# Patient Record
Sex: FEMALE | Race: BLACK OR AFRICAN AMERICAN
[De-identification: names, ages, dates, MRNs, and addresses within clinical notes are randomized per-mention and may not be internally consistent; named-entity substitution may affect disease eponyms.]

---

## 2017-03-16 ENCOUNTER — HOSPITAL ENCOUNTER (INPATIENT)
Dept: HOSPITAL 61 - ER | Age: 82
LOS: 8 days | Discharge: NURSING, REHAB OR LONG-TERM CARE | DRG: 248 | End: 2017-03-24
Attending: FAMILY MEDICINE | Admitting: FAMILY MEDICINE
Payer: MEDICARE

## 2017-03-16 VITALS — BODY MASS INDEX: 26.35 KG/M2 | HEIGHT: 62 IN | WEIGHT: 143.19 LBS

## 2017-03-16 DIAGNOSIS — F20.9: ICD-10-CM

## 2017-03-16 DIAGNOSIS — J44.9: ICD-10-CM

## 2017-03-16 DIAGNOSIS — Z86.010: ICD-10-CM

## 2017-03-16 DIAGNOSIS — I70.0: ICD-10-CM

## 2017-03-16 DIAGNOSIS — I50.23: ICD-10-CM

## 2017-03-16 DIAGNOSIS — I11.0: ICD-10-CM

## 2017-03-16 DIAGNOSIS — M85.80: ICD-10-CM

## 2017-03-16 DIAGNOSIS — I21.4: Primary | ICD-10-CM

## 2017-03-16 DIAGNOSIS — I87.2: ICD-10-CM

## 2017-03-16 DIAGNOSIS — E87.6: ICD-10-CM

## 2017-03-16 DIAGNOSIS — E74.39: ICD-10-CM

## 2017-03-16 DIAGNOSIS — I25.10: ICD-10-CM

## 2017-03-16 DIAGNOSIS — J96.01: ICD-10-CM

## 2017-03-16 DIAGNOSIS — F03.90: ICD-10-CM

## 2017-03-16 DIAGNOSIS — I08.3: ICD-10-CM

## 2017-03-16 DIAGNOSIS — Z90.49: ICD-10-CM

## 2017-03-16 DIAGNOSIS — I42.9: ICD-10-CM

## 2017-03-16 LAB
ALBUMIN SERPL-MCNC: 3.5 G/DL (ref 3.4–5)
ALP SERPL-CCNC: 65 U/L (ref 46–116)
ALT SERPL-CCNC: 25 U/L (ref 14–59)
ANION GAP SERPL CALC-SCNC: 11 MMOL/L (ref 6–14)
AST SERPL-CCNC: 26 U/L (ref 15–37)
BASOPHILS # BLD AUTO: 0 X10^3/UL (ref 0–0.2)
BASOPHILS NFR BLD: 0 % (ref 0–3)
BILIRUB DIRECT SERPL-MCNC: 0.2 MG/DL (ref 0–0.2)
BILIRUB SERPL-MCNC: 0.5 MG/DL (ref 0.2–1)
BUN SERPL-MCNC: 18 MG/DL (ref 7–20)
CALCIUM SERPL-MCNC: 9.6 MG/DL (ref 8.5–10.1)
CHLORIDE SERPL-SCNC: 106 MMOL/L (ref 98–107)
CO2 SERPL-SCNC: 28 MMOL/L (ref 21–32)
CREAT SERPL-MCNC: 1 MG/DL (ref 0.6–1)
EOSINOPHIL NFR BLD: 1 % (ref 0–3)
ERYTHROCYTE [DISTWIDTH] IN BLOOD BY AUTOMATED COUNT: 13.2 % (ref 11.5–14.5)
GFR SERPLBLD BASED ON 1.73 SQ M-ARVRAT: 63.3 ML/MIN
GLUCOSE SERPL-MCNC: 109 MG/DL (ref 70–99)
HCT VFR BLD CALC: 35.9 % (ref 36–47)
HGB BLD-MCNC: 11.3 G/DL (ref 12–15.5)
LYMPHOCYTES # BLD: 0.5 X10^3/UL (ref 1–4.8)
LYMPHOCYTES NFR BLD AUTO: 7 % (ref 24–48)
MCH RBC QN AUTO: 29 PG (ref 25–35)
MCHC RBC AUTO-ENTMCNC: 32 G/DL (ref 31–37)
MCV RBC AUTO: 92 FL (ref 79–100)
MONOCYTES NFR BLD: 8 % (ref 0–9)
NEUTROPHILS NFR BLD AUTO: 84 % (ref 31–73)
PLATELET # BLD AUTO: 178 X10^3/UL (ref 140–400)
POTASSIUM SERPL-SCNC: 3.2 MMOL/L (ref 3.5–5.1)
PROT SERPL-MCNC: 7.2 G/DL (ref 6.4–8.2)
RBC # BLD AUTO: 3.92 X10^6/UL (ref 3.5–5.4)
SODIUM SERPL-SCNC: 145 MMOL/L (ref 136–145)
WBC # BLD AUTO: 7.5 X10^3/UL (ref 4–11)

## 2017-03-16 PROCEDURE — 93306 TTE W/DOPPLER COMPLETE: CPT

## 2017-03-16 PROCEDURE — 84484 ASSAY OF TROPONIN QUANT: CPT

## 2017-03-16 PROCEDURE — 84443 ASSAY THYROID STIM HORMONE: CPT

## 2017-03-16 PROCEDURE — 83690 ASSAY OF LIPASE: CPT

## 2017-03-16 PROCEDURE — 96375 TX/PRO/DX INJ NEW DRUG ADDON: CPT

## 2017-03-16 PROCEDURE — 80061 LIPID PANEL: CPT

## 2017-03-16 PROCEDURE — 90686 IIV4 VACC NO PRSV 0.5 ML IM: CPT

## 2017-03-16 PROCEDURE — 80076 HEPATIC FUNCTION PANEL: CPT

## 2017-03-16 PROCEDURE — 90732 PPSV23 VACC 2 YRS+ SUBQ/IM: CPT

## 2017-03-16 PROCEDURE — 92928 PRQ TCAT PLMT NTRAC ST 1 LES: CPT

## 2017-03-16 PROCEDURE — 94640 AIRWAY INHALATION TREATMENT: CPT

## 2017-03-16 PROCEDURE — 81001 URINALYSIS AUTO W/SCOPE: CPT

## 2017-03-16 PROCEDURE — C1887 CATHETER, GUIDING: HCPCS

## 2017-03-16 PROCEDURE — 93454 CORONARY ARTERY ANGIO S&I: CPT

## 2017-03-16 PROCEDURE — 85027 COMPLETE CBC AUTOMATED: CPT

## 2017-03-16 PROCEDURE — C1769 GUIDE WIRE: HCPCS

## 2017-03-16 PROCEDURE — 73521 X-RAY EXAM HIPS BI 2 VIEWS: CPT

## 2017-03-16 PROCEDURE — 71020: CPT

## 2017-03-16 PROCEDURE — 83880 ASSAY OF NATRIURETIC PEPTIDE: CPT

## 2017-03-16 PROCEDURE — G0238 OTH RESP PROC, INDIV: HCPCS

## 2017-03-16 PROCEDURE — 82947 ASSAY GLUCOSE BLOOD QUANT: CPT

## 2017-03-16 PROCEDURE — 82550 ASSAY OF CK (CPK): CPT

## 2017-03-16 PROCEDURE — 83605 ASSAY OF LACTIC ACID: CPT

## 2017-03-16 PROCEDURE — C1725 CATH, TRANSLUMIN NON-LASER: HCPCS

## 2017-03-16 PROCEDURE — 80048 BASIC METABOLIC PNL TOTAL CA: CPT

## 2017-03-16 PROCEDURE — 83735 ASSAY OF MAGNESIUM: CPT

## 2017-03-16 PROCEDURE — 96374 THER/PROPH/DIAG INJ IV PUSH: CPT

## 2017-03-16 PROCEDURE — 94760 N-INVAS EAR/PLS OXIMETRY 1: CPT

## 2017-03-16 PROCEDURE — 36415 COLL VENOUS BLD VENIPUNCTURE: CPT

## 2017-03-16 PROCEDURE — C1713 ANCHOR/SCREW BN/BN,TIS/BN: HCPCS

## 2017-03-16 PROCEDURE — C1892 INTRO/SHEATH,FIXED,PEEL-AWAY: HCPCS

## 2017-03-16 PROCEDURE — 83036 HEMOGLOBIN GLYCOSYLATED A1C: CPT

## 2017-03-16 PROCEDURE — 71010: CPT

## 2017-03-16 PROCEDURE — 93005 ELECTROCARDIOGRAM TRACING: CPT

## 2017-03-16 NOTE — PHYS DOC
Past Medical History


Past Medical History:  Dementia, Schizophrenia


Additional Past Medical Histor:  Dementia


Past Surgical History:  Other


Additional Past Surgical Histo:  Polyps removed


Alcohol Use:  None


Drug Use:  None





Adult General


Chief Complaint


Chief Complaint:  LOWER EXTREMITY EDEMA





HPI


HPI


88-year-old female presenting to the emergency department today with lower 

extremity edema for the past week. She also feels mildly lightheaded when she 

stands up. Her family members here with her today. She denies any pain 

currently she denies being short of breath or having abdominal pain. Onset 1 

week. Location legs. Duration intermittent. Alleviated by raising up her feet.





Review of systems is negative for chest pain shortness of breath abdominal pain 

nausea vomiting fevers or chills. She also denies cough. All other review of 

systems is negative unless otherwise noted in history of present illness.





Review of Systems


Review of Systems


SEE ABOVE.





Current Medications


Current Medications








 Current Medications








 Medications


  (Trade)  Dose


 Ordered  Sig/Lexie  Start Time


 Stop Time Status Last Admin


Dose Admin


 


 Aspirin


  (Children'S


 Aspirin)  324 mg  1X  ONCE  3/17/17 00:15


 3/17/17 00:16 UNV  


 


 


 Furosemide


  (Lasix)  40 mg  1X  ONCE  3/17/17 00:15


 3/17/17 00:16 UNV  


 


 


 Morphine Sulfate  2 mg  PRN Q2HR  PRN  3/17/17 00:15


 3/18/17 00:14 UNV  


 


 


 Ondansetron HCl


  (Zofran)  4 mg  PRN Q8HRS  PRN  3/17/17 00:15


 3/18/17 00:14 UNV  


 


 


 Potassium Chloride


  (Klor-Con)  40 meq  1X  ONCE  3/17/17 00:15


 3/17/17 00:16 UNV  


 














Allergies


Allergies





 Allergies








Coded Allergies Type Severity Reaction Last Updated Verified


 


  No Known Drug Allergies    2/16/15 No











Physical Exam


Physical Exam





Constitutional: Well developed, well nourished, no acute distress, non-toxic 

appearance. 


HENT: Normocephalic, atraumatic, bilateral external ears normal, oropharynx 

moist, no oral exudates, nose normal. []


Eyes: PERRLA, EOMI, conjunctiva normal, no discharge.  


Neck: Normal range of motion, no tenderness, supple, no stridor.  


Cardiovascular:Heart rate regular rhythm, no murmur 


Lungs & Thorax:  Bilateral breath sounds clear to auscultation []


Abdomen: Bowel sounds normal, soft, no tenderness, no masses, no pulsatile 

masses.  


Skin: Warm, dry, no erythema, no rash.  


Back: No tenderness, no CVA tenderness. [] 


Extremities: No tenderness, no cyanosis, no clubbing, ROM intact, 2+ edema


Neurologic: Alert and oriented X 3, normal motor function, normal sensory 

function, no focal deficits noted. 


Psychologic: Affect normal, judgement normal, mood normal. []





Current Patient Data


Vital Signs





 Vital Signs








  Date Time  Temp Pulse Resp B/P Pulse Ox O2 Delivery O2 Flow Rate FiO2


 


3/16/17 21:52 98.0 85 20 98/63 99 Room Air  





 98.0       








Lab Values





 Laboratory Tests








Test


  3/16/17


23:15


 


White Blood Count


  7.5x10^3/uL


(4.0-11.0)


 


Red Blood Count


  3.92x10^6/uL


(3.50-5.40)


 


Hemoglobin


  11.3g/dL


(12.0-15.5)  L


 


Hematocrit


  35.9%


(36.0-47.0)  L


 


Mean Corpuscular Volume 92fL ()  


 


Mean Corpuscular Hemoglobin 29pg (25-35)  


 


Mean Corpuscular Hemoglobin


Concent 32g/dL (31-37)


 


 


Red Cell Distribution Width


  13.2%


(11.5-14.5)


 


Platelet Count


  178x10^3/uL


(140-400)


 


Neutrophils (%) (Auto) 84% (31-73)  H


 


Lymphocytes (%) (Auto) 7% (24-48)  L


 


Monocytes (%) (Auto) 8% (0-9)  


 


Eosinophils (%) (Auto) 1% (0-3)  


 


Basophils (%) (Auto) 0% (0-3)  


 


Neutrophils # (Auto)


  6.3x10^3uL


(1.8-7.7)


 


Lymphocytes # (Auto)


  0.5x10^3/uL


(1.0-4.8)  L


 


Monocytes # (Auto)


  0.6x10^3/uL


(0.0-1.1)


 


Eosinophils # (Auto)


  0.1x10^3/uL


(0.0-0.7)


 


Basophils # (Auto)


  0.0x10^3/uL


(0.0-0.2)


 


Sodium Level


  145mmol/L


(136-145)


 


Potassium Level


  3.2mmol/L


(3.5-5.1)  L


 


Chloride Level


  106mmol/L


()


 


Carbon Dioxide Level


  28mmol/L


(21-32)


 


Anion Gap 11 (6-14)  


 


Blood Urea Nitrogen


  18mg/dL (7-20)


 


 


Creatinine


  1.0mg/dL


(0.6-1.0)


 


Estimated GFR


(Cockcroft-Gault) 63.3  


 


 


Glucose Level


  109mg/dL


(70-99)  H


 


Lactic Acid Level


  2.7mmol/L


(0.4-2.0)  H


 


Calcium Level


  9.6mg/dL


(8.5-10.1)


 


Total Bilirubin


  0.5mg/dL


(0.2-1.0)


 


Direct Bilirubin


  0.2mg/dL


(0.0-0.2)


 


Aspartate Amino Transferase


(AST) 26U/L (15-37)  


 


 


Alanine Aminotransferase (ALT) 25U/L (14-59)  


 


Alkaline Phosphatase


  65U/L ()


 


 


Creatine Kinase


  214U/L


()  H


 


Troponin I Quantitative


  0.666ng/mL


(0.000-0.055)


 


NT-Pro-B-Type Natriuretic


Peptide 14078ph/mL


(0-449)  H


 


Total Protein


  7.2g/dL


(6.4-8.2)


 


Albumin


  3.5g/dL


(3.4-5.0)


 


Lipase


  74U/L ()


 





 Laboratory Tests


3/16/17 23:15








 Laboratory Tests


3/16/17 23:15











EKG


EKG


EKG shows sinus rhythm with a few PACs. LVH present with minimal 

repolarization. Not suggestive of acute ischemia. []





Radiology/Procedures


Radiology/Procedures


[] Chest x-ray read by myself shows pulmonary edema present. Mild blunting of 

the costophrenic angles bilaterally suggestive of pleural effusions. Otherwise 

no evidence of pneumothorax or infiltrate present.





Course & Med Decision Making


Course & Med Decision Making


Pertinent Labs and Imaging studies reviewed. (See chart for details)





[] 88-year-old female presenting to the emergency department today with 

bilateral pedal edema. Triage vital signs showed afebrile normal heart rate. 

Satting well on room air. Pertinent physical exam findings showed edema in the 

legs bilaterally. Chest x-ray shows mild to moderate pulmonary edema. Blood 

work obtained. EKG not suggestive of ischemia. ProBNP elevated. Troponin mildly 

elevated. I added on serial troponins. Cardiology consultation. Patient given 

Lasix and aspirin in the emergency department. Patient admitted to our 

cardiovascular care unit for further evaluation workup and care.





Dragon Disclaimer


Dragon Disclaimer


This electronic medical record was generated, in whole or in part, using a 

voice recognition dictation system.





Departure


Departure


Impression:  


 Primary Impression:  


 Pedal edema


 Additional Impressions:  


 Pulmonary edema


 CHF (congestive heart failure)


Disposition:  09 ADMITTED AS INPATIENT


Admitting Physician:  Lucia Miller


Condition:  STABLE


Referrals:  


LUCIA MILLER MD (PCP)





Problem Qualifiers








DIDIER ADKINS MD Mar 16, 2017 23:20

## 2017-03-17 VITALS — DIASTOLIC BLOOD PRESSURE: 106 MMHG | SYSTOLIC BLOOD PRESSURE: 169 MMHG

## 2017-03-17 VITALS — SYSTOLIC BLOOD PRESSURE: 124 MMHG | DIASTOLIC BLOOD PRESSURE: 65 MMHG

## 2017-03-17 VITALS — SYSTOLIC BLOOD PRESSURE: 117 MMHG | DIASTOLIC BLOOD PRESSURE: 76 MMHG

## 2017-03-17 VITALS — SYSTOLIC BLOOD PRESSURE: 155 MMHG | DIASTOLIC BLOOD PRESSURE: 101 MMHG

## 2017-03-17 VITALS — DIASTOLIC BLOOD PRESSURE: 100 MMHG | SYSTOLIC BLOOD PRESSURE: 160 MMHG

## 2017-03-17 VITALS — DIASTOLIC BLOOD PRESSURE: 108 MMHG | SYSTOLIC BLOOD PRESSURE: 164 MMHG

## 2017-03-17 VITALS — SYSTOLIC BLOOD PRESSURE: 169 MMHG | DIASTOLIC BLOOD PRESSURE: 98 MMHG

## 2017-03-17 LAB
BACTERIA #/AREA URNS HPF: (no result) /HPF
BILIRUB UR QL STRIP: NEGATIVE
GLUCOSE UR STRIP-MCNC: NEGATIVE MG/DL
NITRITE UR QL STRIP: NEGATIVE
PH UR STRIP: 5 [PH]
PROT UR STRIP-MCNC: NEGATIVE MG/DL
RBC #/AREA URNS HPF: (no result) /HPF (ref 0–2)
SP GR UR STRIP: 1.01
SQUAMOUS #/AREA URNS LPF: (no result) /LPF
UROBILINOGEN UR-MCNC: 0.2 MG/DL
WBC #/AREA URNS HPF: (no result) /HPF (ref 0–4)

## 2017-03-17 RX ADMIN — ALBUTEROL SULFATE SCH MG: 108 AEROSOL, METERED RESPIRATORY (INHALATION) at 11:24

## 2017-03-17 RX ADMIN — ALBUTEROL SULFATE SCH MG: 108 AEROSOL, METERED RESPIRATORY (INHALATION) at 08:15

## 2017-03-17 RX ADMIN — ALBUTEROL SULFATE SCH MG: 108 AEROSOL, METERED RESPIRATORY (INHALATION) at 20:25

## 2017-03-17 NOTE — CARD
--------------- APPROVED REPORT --------------





EXAM: Two-dimensional and M-mode echocardiogram with Doppler and color Doppler.



Other Information 

Quality : Good



INDICATION

Congestive Heart Failure 



2D DIMENSIONS 

RVDd2.6 (2.9-3.5cm)Left Atrium(2D)4.1 (1.6-4.0cm)

IVSd1.3 (0.7-1.1cm)Aortic Root(2D)2.6 (2.0-3.7cm)

LVDd4.1 (3.9-5.9cm)LVOT Diameter2.0 (1.8-2.4cm)

PWd1.3 (0.7-1.1cm)LVDs3.0 (2.5-4.0cm)

FS (%) 20.0 %SV40.8 ml



Aortic Valve

AoV Peak Marvin.429.8cm/sAoV VTI89.2cm

AO Peak GR.73.9mmHgLVOT Peak Marvin.92.5cm/s

AO Mean GR.42mmHgAVA (VMAX)0.67cm2

JONNY   (VTI)0.28ck8RB P 1/2 Vkzw465lo



Mitral Valve

MV E Kimftpnn022.2cm/sMV E Peak Gr.16mmHg

MV DECEL [id number] A Kihfzzki93.3cm/s

MV E Mean Gr.7mmHgE/A  Ratio1.8

MV A Swlzoust83ap



Tricuspid Valve

TR P. Rkqzddjl019si/sRAP YTNACMTA26otDj

TR Peak Gr.09emWkYLTO14uhIl



 LEFT VENTRICLE 

The left ventricle is normal size. There is mild concentric left ventricular hypertrophy. Left ventri
cular systolic function is moderately to moderately severely decreased. Left ventricular ejection fra
ction is estimated at 35%. There is moderate global hypokinesis of the left ventricle. Transmitral Do
ppler flow pattern is restrictive diastolic dysfunction.



 RIGHT VENTRICLE 

The right ventricle is normal size. The right ventricular systolic function is normal.



 ATRIA 

The left atrium is mildly dilated. The right atrium is mildly dilated. The interatrial septum is inta
ct with no evidence for an atrial septal defect or patent foramen ovale as noted on 2-D or Doppler im
aging.



 AORTIC VALVE 

The aortic valve is calcified and displays decreased opening. Doppler and Color Flow revealed moderat
e aortic regurgitation. Calculated aortic valve maximum pressure gradient of 74 mmHg and mean pressur
e gradient of 42 mmHg. Doppler and color-flow analysis revealed moderately severe severe aortic steno
sis.



 MITRAL VALVE 

The mitral valve is thickened but opens well. There is no evidence of mitral valve prolapse. There is
 no mitral valve stenosis. Doppler and Color-flow revealed moderately severe mitral regurgitation.



 TRICUSPID VALVE 

The tricuspid valve is normal in structure and function. Doppler and Color Flow revealed moderately s
evere tricuspid regurgitation. The PA pressure was estimated at 78 mmHg. There is no tricuspid valve 
stenosis.



 PULMONIC VALVE 

The pulmonary valve is normal in structure and function. Doppler and Color Flow revealed mild pulmoni
c valvular regurgitation. There is no pulmonic valvular stenosis.



 GREAT VESSELS 

The aortic root is mildly enlarged at 3.5 cm. The IVC is dilated and collapses <50% with inspiration.




 PERICARDIAL EFFUSION 

There is moderate pleural effusion. There is a trace pericardial effusion with no hemodynamic signifi
cance. 



Critical Notification

Critical Value: No



<Conclusion>

The left ventricle is normal size.

Left ventricular systolic function is moderately to moderately severely decreased.

Left ventricular ejection fraction is estimated at 35%.

There is moderate global hypokinesis of the left ventricle.

There is mild concentric left ventricular hypertrophy.

Calculated aortic valve  maximum pressure gradient of 74 mmHg and mean pressure gradient of 42 mmHg.

Doppler and color-flow analysis revealed moderately severe severe aortic stenosis.

Doppler and Color Flow revealed moderate aortic regurgitation.

Doppler and Color-flow revealed moderately severe  mitral regurgitation.

Doppler and Color Flow revealed moderately severe tricuspid regurgitation.

The PA pressure was estimated at 78 mmHg.

## 2017-03-17 NOTE — PDOC2
CARDIAC CONSULT


DATE OF CONSULT


Date of Consult


DATE: 3/17/17 


TIME: 11:41





REASON FOR CONSULT


Reason for Consult:


CHF





REFERRING PHYSICIAN


Referring Physician:


Dr. Dusty Verdin





SOURCE


Source:  Chart review, Patient (who is a poor historian)





HISTORY OF PRESENT ILLNESS


HISTORY OF PRESENT ILLNESS


88 year old  female admitted through the ER with dyspnea and 

lower extremity edema of possibly 2 weeks duration.  No family present for this 

consultation. ? dizziness but no chest pain or dizziness. Initial troponin was 

0.666 and trending downward, EKG without acute changes and with T wave 

inversions in V4 and V5 not present on EKG from 2016.  NTproBNP was 11,602 and 

patient treated with IV Lasix in ER.  K - 3.2 and supplemented.  Lactic acid 

level 2.4.


Reason for Visit:  CHF





PAST MEDICAL HISTORY


Cardiovascular:  HTN (but has been off meds), Other (chronic venous 

insufficiency LE extremities)


CENTRAL NERVOUS SYSTEM:  Dementia


GI:  Other (colon polyps)


Psych:  Schizophrenia (who is not a reliable historian)


Endocrine:  Osteopenia





PAST SURGICAL HISTORY


Past Surgical History:  Appendectomy, Colectomy (partial)





FAMILY HISTORY


Family History:  Other (non-contributory)





SOCIAL HISTORY


Lives:  with Family (daughter)





CURRENT MEDICATIONS


CURRENT MEDICATIONS





Current Medications








 Medications


  (Trade)  Dose


 Ordered  Sig/Lexie


 Route


 PRN Reason  Start Time


 Stop Time Status Last Admin


Dose Admin


 


 Aspirin


  (Children'S


 Aspirin)  324 mg  1X  ONCE


 PO


   3/17/17 00:30


 3/17/17 00:31 DC 3/17/17 00:50


 


 


 Furosemide


  (Lasix)  40 mg  1X  ONCE


 IVP


   3/17/17 00:30


 3/17/17 00:31 DC 3/17/17 00:49


 


 


 Potassium Chloride


  (Klor-Con)  40 meq  1X  ONCE


 PO


   3/17/17 00:30


 3/17/17 00:31 DC 3/17/17 00:50


 


 


 Albuterol/


 Ipratropium


  (Duoneb)  3 ml  1X  ONCE


 NEB


   3/17/17 01:30


 3/17/17 01:31 DC 3/17/17 01:20


 


 


 Methylprednisolone


 Sodium Succinate


  (Solu-Medrol


 125mg Vial)  125 mg  1X  ONCE


 IV


   3/17/17 01:30


 3/17/17 01:31 DC 3/17/17 01:50


 











ALLERGIES


ALLERGIES:  


Coded Allergies:  


     No Known Drug Allergies (Unverified , 2/16/15)





PHYSICAL EXAM


General:  Alert, Cooperative, mild distress


HEENT:  Atraumatic, PERRLA


Lungs:  Other (basilar crackles; exp wheezing throughout posteriorly)


Heart:  Normal S1, Normal S2


Abdomen:  Normal bowel sounds, Soft


Extremities:  Other (3+ bilateral LE edema)


Skin:  No rashes, Other (stasis changes on bilateral LE )


Neuro:  Normal speech


Psych/Mental Status:  Mood NL


MUSCULOSKELETAL:  Osteoarthritic changes both hands





VITALS


VITALS





 Vital Signs








  Date Time  Temp Pulse Resp B/P Pulse Ox O2 Delivery O2 Flow Rate FiO2


 


3/17/17 10:22 97.3 105 23 164/108 96 Nasal Cannula 2.0 





 97.3       











LABS


Lab:





Laboratory Tests








Test


  3/16/17


23:15 3/17/17


06:43 3/17/17


09:27


 


White Blood Count


  7.5x10^3/uL


(4.0-11.0) 


  


 


 


Red Blood Count


  3.92x10^6/uL


(3.50-5.40) 


  


 


 


Hemoglobin


  11.3g/dL


(12.0-15.5) 


  


 


 


Hematocrit


  35.9%


(36.0-47.0) 


  


 


 


Mean Corpuscular Volume 92fL ()   


 


Mean Corpuscular Hemoglobin 29pg (25-35)   


 


Mean Corpuscular Hemoglobin


Concent 32g/dL (31-37) 


  


  


 


 


Red Cell Distribution Width


  13.2%


(11.5-14.5) 


  


 


 


Platelet Count


  178x10^3/uL


(140-400) 


  


 


 


Neutrophils (%) (Auto) 84% (31-73)   


 


Lymphocytes (%) (Auto) 7% (24-48)   


 


Monocytes (%) (Auto) 8% (0-9)   


 


Eosinophils (%) (Auto) 1% (0-3)   


 


Basophils (%) (Auto) 0% (0-3)   


 


Neutrophils # (Auto)


  6.3x10^3uL


(1.8-7.7) 


  


 


 


Lymphocytes # (Auto)


  0.5x10^3/uL


(1.0-4.8) 


  


 


 


Monocytes # (Auto)


  0.6x10^3/uL


(0.0-1.1) 


  


 


 


Eosinophils # (Auto)


  0.1x10^3/uL


(0.0-0.7) 


  


 


 


Basophils # (Auto)


  0.0x10^3/uL


(0.0-0.2) 


  


 


 


Sodium Level


  145mmol/L


(136-145) 


  


 


 


Potassium Level


  3.2mmol/L


(3.5-5.1) 


  


 


 


Chloride Level


  106mmol/L


() 


  


 


 


Carbon Dioxide Level


  28mmol/L


(21-32) 


  


 


 


Anion Gap 11 (6-14)   


 


Blood Urea Nitrogen 18mg/dL (7-20)   


 


Creatinine


  1.0mg/dL


(0.6-1.0) 


  


 


 


Estimated GFR


(Cockcroft-Gault) 63.3 


  


  


 


 


Glucose Level


  109mg/dL


(70-99) 


  


 


 


Lactic Acid Level


  2.7mmol/L


(0.4-2.0) 


  


 


 


Calcium Level


  9.6mg/dL


(8.5-10.1) 


  


 


 


Total Bilirubin


  0.5mg/dL


(0.2-1.0) 


  


 


 


Direct Bilirubin


  0.2mg/dL


(0.0-0.2) 


  


 


 


Aspartate Amino Transf


(AST/SGOT) 26U/L (15-37) 


  


  


 


 


Alanine Aminotransferase


(ALT/SGPT) 25U/L (14-59) 


  


  


 


 


Alkaline Phosphatase 65U/L ()   


 


Creatine Kinase


  214U/L


() 


  


 


 


Troponin I Quantitative


  0.666ng/mL


(0.000-0.055) 0.562ng/mL


(0.000-0.055) 


 


 


NT-Pro-B-Type Natriuretic


Peptide 99063ci/mL


(0-449) 


  


 


 


Total Protein


  7.2g/dL


(6.4-8.2) 


  


 


 


Albumin


  3.5g/dL


(3.4-5.0) 


  


 


 


Lipase 74U/L ()   


 


Thyroid Stimulating Hormone


(TSH) 


  1.520uIU/mL


(0.358-3.74) 


 


 


Glucose (Fingerstick)


  


  


  153mg/dL


(70-99)











IMAGES


IMAGES


CXR: 





Comparison is made to a study from 2/16/2015. The heart is enlarged. The


pulmonary vascularity is now congested with interstitial prominence suggesting


mild pulmonary edema. There is blunting of the lateral costophrenic angles


compatible with a small amount of associated pleural fluid. Degenerative


changes are present in the spine.








IMPRESSION: Congestive heart failure with mild pulmonary edema and small


pleural effusions.





EKG


EKG


see HPI





ASSESSMENT/PLAN


ASSESSMENT/PLAN


1. acute CHF,  ? diastolic


   BP initially low then elevated


   IV diuretics ~ MN in ER; will repeat with 20 mg IV X 1 now and re-evaluate 

in a.m. 


   echo to evaluate LV function 





2.  elevated troponin


   ? demand vs ischemia


   currently is poor candidate for aggressive evaluation, but would like to 

discuss with family to determine their preferences


      not currently able to lie flat


   echo to evaluate WMA





3.  HTN


   beta-blockers considered but with wheezing will defer on these


   started on amlodipine for  now (aware this may increase LE edema)





4.  hypokalemia 


   supplement as giving more furosemide


   check Mg





5.  CVI





6.  wheezing


   ? COPD





7.  dementia


Problems:  








JUDITH RODRIGUEZ APRN Mar 17, 2017 11:46

## 2017-03-17 NOTE — HP
ADMIT DATE:  03/17/2017



CHIEF COMPLAINT:  Lower extremity edema.



HISTORY OF PRESENT ILLNESS:  The patient is an 88-year-old female, who was

brought to the Emergency Room by her family with the above complaint.  She

apparently has a history of some chronic lower extremity edema, but the family

noticed that this had worsened recently.  Evaluation in the Emergency Room

included a chest x-ray which reportedly shows pulmonary edema.  EKG was without

acute ischemic change.  The patient was initially not hypoxic on room air, but

then became hypoxic.  She was placed on a nonrebreather mask.  She was treated

with one dose of Lasix in the Emergency Room and admitted for further treatment.



PAST MEDICAL HISTORY:  Glucose intolerance, chronic venous insufficiency,

schizophrenia, osteopenia, dementia.  The patient apparently has a history of

hypertension, but has not been on medications for her blood pressure for several

years.



PAST SURGICAL HISTORY:  Appendectomy and partial colectomy.



ALLERGIES:  The patient has no known drug allergies.



HOME MEDICATIONS:  None noted on the chart.



FAMILY HISTORY:  Noncontributory.



SOCIAL HISTORY:  The patient is .  She lives with her daughter who

provides her care at home.  She has not smoked cigarettes in 20 years.



REVIEW OF SYSTEMS:  This is a little difficult to obtain due to the patient's

memory loss.  According to the ER, she has denied fever or chills.  She denied

chest pain.  She denied shortness of breath or cough.  She denied abdominal

pain, nausea or vomiting.



PHYSICAL EXAMINATION:

GENERAL:  The patient is alert and oriented to person and place, but not time. 

She is resting comfortably in bed, in no acute distress.

HEENT:  PERRL, EOMI.  Sclerae clear.  Oropharynx:  Mucous membranes are moist.

NECK:  Supple, without lymphadenopathy.

CHEST:  Diffuse coarse breath sounds and expiratory wheezes are heard.

CARDIOVASCULAR:  Regular rhythm.

ABDOMEN:  Soft, nontender, normoactive bowel sounds are present.

EXTREMITIES:  2+ pitting edema with chronic venous stasis changes present in the

bilateral lower extremities.  No erythema.

SKIN:  Intact.



ASSESSMENT AND PLAN:

1.  Acute onset of congestive heart failure with acute respiratory failure: 

Chest x-ray report is pending, but the ER doctor's report indicates that it did

show pulmonary edema.  Nursing reports that her respiratory status has improved

significantly overnight.  She was initially requiring a nonrebreather mask, but

is now having a good oxygen saturation on 2 liters per nasal cannula.  She has

had a good urine output from the one dose of Lasix that she received at

midnight.  The patient's troponin is elevated at 0.666.  Cardiology consult is

pending.

2.  Chronic venous insufficiency:  The patient does have a history of some

chronic edema, which may be worsened now as part of the congestive heart

failure.  This should improve with diuresis.

3.  Glucose intolerance:  The patient has not been on medication for her blood

sugar in the past.  An A1c has been ordered.  Expect that she will have some

hyperglycemia as she received 125 mg of Solu-Medrol in the Emergency Room.

4.  Dementia:  The patient does have a history of this.  She took Risperdal in

the past, but has not been on this for several years.  We will continue

supportive care.

 



______________________________

SANDRA VYAS MD



DR:  ROGELIO/nathalia  JOB#:  [id number] / [id number]

DD:  03/17/2017 07:50  DT:  03/17/2017 08:56

GAMAL

## 2017-03-17 NOTE — EKG
[facility]

               [address] [place] [address]

Test Date:    2017               Test Time:    22:51:46

Pat Name:     KARENA LAM       Department:   

Patient ID:   [id number]           Room:         208 1

Gender:       F                        Technician:   

:          [id number]               Requested By: DIDIER ADKINS

Order Number: [id number]            Reading MD:   Charlette Noble

                                 Measurements

Intervals                              Axis          

Rate:         100                      P:            62

ME:           136                      QRS:          10

QRSD:         84                       T:            126

QT:           370                                    

QTc:          481                                    

                           Interpretive Statements

SINUS RHYTHM

ATRIAL PREMATURE COMPLEX(ES), TRIGEMINY

LEFT ATRIAL ABNORMALITY

LVH WITH REPOLARIZATION ABNORMALITY



ABNORMAL ECG



Electronically Signed On 3- 20:12:11 CDT by Charlette Noble

## 2017-03-17 NOTE — RAD
Pelvis with both hips, 5 views, 3/16/2016:



History: Fall, hip pain



No fracture or dislocation is identified. The hip joints spaces are fairly

well preserved. There are mild degenerative changes in the lower lumbar spine.



IMPRESSION: No acute pelvic or hip abnormality is detected.

## 2017-03-17 NOTE — RAD
Portable chest, 3/16/2017:



History: Leg swelling and weakness



Comparison is made to a study from 2/16/2015. The heart is enlarged. The

pulmonary vascularity is now congested with interstitial prominence suggesting

mild pulmonary edema. There is blunting of the lateral costophrenic angles

compatible with a small amount of associated pleural fluid. Degenerative

changes are present in the spine.





IMPRESSION: Congestive heart failure with mild pulmonary edema and small

pleural effusions.

## 2017-03-17 NOTE — [facility]
Admission Forms Criteria


                                                 HEART FAILURE





Clinical Indications for Admission to Inpatient Care


 


                                                          (Place 'X' for any 

and all applicable criteria):





Admission is indicated by ANY ONE of the following(1)(2)(3)(4):


[ ]I.     Severe electrolyte abnormalities requiring inpatient care(9)


[ ]II.    Hemodynamic instability 


[ ]III.   Anasarca 


[ ]IV.  Acute cardiac ischemia causing or associated with failure (Also use 

Angina or Myocardial Infarction as appropriate)


[ ]V.   Cardiac arrhythmias of immediate concern


[ ]VI.  Precipitating cause for acute decompensation (eg, pneumonia, pulmonary 

embolism) requires inpatient care


[ ]VII. Pulmonary edema that is very severe (eg, mechanical ventilation needed, 

imminent or likely, need for 100% oxygen to keep 


         oxygen saturation above 90%)


[ ]VIII. Inpatient admission required rather than observation care (Also use 

Heart Failure: Observation Care as 


         appropriate) because of ANY ONE of the following:


          [ ]a)   Pulmonary edema that is severe or worsening as indicated by 

ALL of the following:


                   [ ]i)   New need for oxygen therapy to keep oxygen 

saturation above 90% (or increased FiO2 need from baseline)


                   [ ]ii)  Has not improved sufficiently with emergency 

department or observation care IV diuretics or other heart failure treatments[C]


          [ ]b)   Cognitive impairment that is severe or persistent


          [ ]c)   Increased creatinine (new on laboratory test) with reduction 

of more than 50% in estimated glomerular filtration rate from baseline.


          [ ]d)   Acute renal insufficiency (progressively (ongoing) rising 

creatinine (known from past laboratory test) 


                   with reduction of more than 25% in estimated glomerular 

filtration rate from baseline)


          [ ]e)   Acute peripheral ischemia (eg, pulseless, cool, mottled, or 

cyanotic extremity)


          [ ]f)    Acute renal failure     


          [ ]g)   Supplemental O2 or respiratory treatment for >24 hr that are 

performable only in acute inpatient setting


          [ ]h)   Pulmonary artery catheter monitoring


          [ ]i)    Other condition, treatment or monitoring requiring inpatient 

admission





[X]IX.   Contraindications and/or Inappropriate clinical situations for 

Observational Care in patients


          with  Heart Failure, when ANY ONE of the following is required:


          [ ]a)    Patient with High risk of cardiac embolism (e.g, patients 

with previous cardiac embolism, LVEF <


                   40%, age >75 and patients with prosthetic valve) 18


          [ ]b)   Patient with Moderate risk including DM patient, CAD and 

patient aged 65-75 


          [ ]c)   Patient with any change in cardiac biomarker especially 

troponin should be managed as high risk in an inpatient setting 19


          [ ]d)   Physician judgement irrespective of ECG and other diagnostic 

findings 20


          [ ]e)   Patients with hyponatremia have high risk for mortality and 

require more extensive care and length of stay 21


          [X]f)    Need for large volume diuresis 21


          [ ]g)   Presence of renal insufficiency or hypotension limiting speed 

of diuresis 21


          [ ]h)   Acute cardiac Ischemia in the elderly 21


          [ ]i)    Patients with a 30 day risk of mortality based on a 

multidimensional prognostic index (MPI) [J,]21


[ ]X.    General contraindications and/or Inappropriate clinical situations for 

Observational Care in patients


          with Heart Failure, when ANY ONE of the following is required:


           [ ]a)   Prediction of prolongation of LOS based on ANY ONE of the 

following may be considered as 


                   a contraindication for observational care 2, 3, 4, 5, 6, 7, 8

, 9, 10, 11


                   [ ]i)    Age > 65 yrs.


                   [ ]ii)   Patient arriving by ambulance


                   [ ]iii)  Patient with high acuity


                   [ ]iv)  Patient requiring vital sign monitoring


                   [ ]v)   Patient on IV medication


           [ ]b)  Systolic blood pressures  180mmHg 3,12


           [ ]c)  Patient with altered mental status including delirium and 

other alteration of consciousness, (3)


           [ ]d)  Patient whose discharge disposition will be to a skilled 

nursing home or rehabilitation home should 


                   not be managed in Emergency Department Observation Unit. CMS 

rule requires 3 days hospital stay before such placement.3,13


           [ ]e)  Patient with failure to thrive due to broad array of 

etiologies 3,16,17


           [ ]f)  Inability to ambulate 3,14








Extended stay beyond goal length of stay may be needed for(1)(3)(21)(25):


[ ]a)    Cardiac ischemia, confirmed or suspected as precipitant 


[ ]b)    Cardiogenic shock or refractory pulmonary edema 


[ ]c)    Acute kidney injury or renal failure 


[ ]d)    Respiratory failure (eg, need for noninvasive or invasive mechanical 

ventilation) (23)


[ ]e)    Concomitant pneumonia or significant electrolyte abnormality (eg, 

severe hyponatremia)


[ ]f)     Newly diagnosed (new onset) atrial fibrillation        


[ ]g)    Stage IV chronic kidney disease (estimated glomerular filtration rate 

of less than 30 mL/min/1.73m2 


          (0.50 mL/sec/1.73m2), and not previously on chronic dialysis     








The original [facility] content created by [facility] 

[facility] has been revised. The portions of the


content which have been revised are identified through the use of italic text 

or in bold, and [facility] has neither      


reviewed nor approved the modified material. All other unmodified content is 

copyright  [facility].





Please see references footnoted in the original [facility] edition 

2016


Admission Criteria Met?:  Yes








LAURA MONZON Mar 17, 2017 01:34

## 2017-03-17 NOTE — PDOC
PROGRESS NOTES


Subjective


Subjective


Patient denies chest pain or SOA.





Objective


Objective





 Vital Signs








  Date Time  Temp Pulse Resp B/P Pulse Ox O2 Delivery O2 Flow Rate FiO2


 


3/17/17 06:41   22  100 Nasal Cannula 2.0 


 


3/17/17 04:45  79      


 


3/17/17 02:05 97.4   155/101    





 97.4       











Physical Exam


Abdomen:  Normal bowel sounds, Soft, No tenderness


Heart:  Regular rate


Extremities:  Other (2+ pitting edema with chronic stasis changes, no erythema, 

bilateral LE's)


General:  Alert (oriented to person and place), No acute distress


Lungs:  Other (diffuse coarse BS and expiratory wheezes)





Assessment


Assessment


 Problems


Medical Problems:


(1) CHF (congestive heart failure)


Status: Acute  





(2) Elevated glucose


Status: Acute  





(3) Pedal edema


Status: Acute  





(4) Pulmonary edema


Status: Acute  











Plan


Plan of Care


1. Acute onset CHF with acute respiratory failure - CXR report pending. Patient'

s first Troponin is elevated, EKG reportedly without acute ischemia and patient 

denies CP at this time. Received Lasix x1 in ER, has had good urine output with 

this and improvement in her respiratory status overnight. Lab pending from this 

morning. Nebs ordered, Cardiology consult pending.


2. chronic venous insufficiency - patient apparently has hx of chronic LE edema

, may be worsened now per family report. 


3. glucose intolerance - patient has not needed medication for this in the 

past. Check A1C. Expect some hyperglycemia as she received 125mg Solumedrol in 

ER.


4. dementia - has history of this, continue supportive care.





Comment


Review of Relevant


I have reviewed the following items wally (where applicable) has been applied.


Labs





Laboratory Tests








Test


  3/16/17


23:15 3/17/17


06:43


 


White Blood Count


  7.5x10^3/uL


(4.0-11.0) 


 


 


Red Blood Count


  3.92x10^6/uL


(3.50-5.40) 


 


 


Hemoglobin


  11.3g/dL


(12.0-15.5) 


 


 


Hematocrit


  35.9%


(36.0-47.0) 


 


 


Mean Corpuscular Volume 92fL ()  


 


Mean Corpuscular Hemoglobin 29pg (25-35)  


 


Mean Corpuscular Hemoglobin


Concent 32g/dL (31-37) 


  


 


 


Red Cell Distribution Width


  13.2%


(11.5-14.5) 


 


 


Platelet Count


  178x10^3/uL


(140-400) 


 


 


Neutrophils (%) (Auto) 84% (31-73)  


 


Lymphocytes (%) (Auto) 7% (24-48)  


 


Monocytes (%) (Auto) 8% (0-9)  


 


Eosinophils (%) (Auto) 1% (0-3)  


 


Basophils (%) (Auto) 0% (0-3)  


 


Neutrophils # (Auto)


  6.3x10^3uL


(1.8-7.7) 


 


 


Lymphocytes # (Auto)


  0.5x10^3/uL


(1.0-4.8) 


 


 


Monocytes # (Auto)


  0.6x10^3/uL


(0.0-1.1) 


 


 


Eosinophils # (Auto)


  0.1x10^3/uL


(0.0-0.7) 


 


 


Basophils # (Auto)


  0.0x10^3/uL


(0.0-0.2) 


 


 


Sodium Level


  145mmol/L


(136-145) 


 


 


Potassium Level


  3.2mmol/L


(3.5-5.1) 


 


 


Chloride Level


  106mmol/L


() 


 


 


Carbon Dioxide Level


  28mmol/L


(21-32) 


 


 


Anion Gap 11 (6-14)  


 


Blood Urea Nitrogen 18mg/dL (7-20)  


 


Creatinine


  1.0mg/dL


(0.6-1.0) 


 


 


Estimated GFR


(Cockcroft-Gault) 63.3 


  


 


 


Glucose Level


  109mg/dL


(70-99) 


 


 


Lactic Acid Level


  2.7mmol/L


(0.4-2.0) 


 


 


Calcium Level


  9.6mg/dL


(8.5-10.1) 


 


 


Total Bilirubin


  0.5mg/dL


(0.2-1.0) 


 


 


Direct Bilirubin


  0.2mg/dL


(0.0-0.2) 


 


 


Aspartate Amino Transf


(AST/SGOT) 26U/L (15-37) 


  


 


 


Alanine Aminotransferase


(ALT/SGPT) 25U/L (14-59) 


  


 


 


Alkaline Phosphatase 65U/L ()  


 


Creatine Kinase


  214U/L


() 


 


 


Troponin I Quantitative


  0.666ng/mL


(0.000-0.055) 0.562ng/mL


(0.000-0.055)


 


NT-Pro-B-Type Natriuretic


Peptide 96328ba/mL


(0-449) 


 


 


Total Protein


  7.2g/dL


(6.4-8.2) 


 


 


Albumin


  3.5g/dL


(3.4-5.0) 


 


 


Lipase 74U/L ()  








Laboratory Tests








Test


  3/16/17


23:15 3/17/17


06:43


 


White Blood Count


  7.5x10^3/uL


(4.0-11.0) 


 


 


Red Blood Count


  3.92x10^6/uL


(3.50-5.40) 


 


 


Hemoglobin


  11.3g/dL


(12.0-15.5) 


 


 


Hematocrit


  35.9%


(36.0-47.0) 


 


 


Mean Corpuscular Volume 92fL ()  


 


Mean Corpuscular Hemoglobin 29pg (25-35)  


 


Mean Corpuscular Hemoglobin


Concent 32g/dL (31-37) 


  


 


 


Red Cell Distribution Width


  13.2%


(11.5-14.5) 


 


 


Platelet Count


  178x10^3/uL


(140-400) 


 


 


Neutrophils (%) (Auto) 84% (31-73)  


 


Lymphocytes (%) (Auto) 7% (24-48)  


 


Monocytes (%) (Auto) 8% (0-9)  


 


Eosinophils (%) (Auto) 1% (0-3)  


 


Basophils (%) (Auto) 0% (0-3)  


 


Neutrophils # (Auto)


  6.3x10^3uL


(1.8-7.7) 


 


 


Lymphocytes # (Auto)


  0.5x10^3/uL


(1.0-4.8) 


 


 


Monocytes # (Auto)


  0.6x10^3/uL


(0.0-1.1) 


 


 


Eosinophils # (Auto)


  0.1x10^3/uL


(0.0-0.7) 


 


 


Basophils # (Auto)


  0.0x10^3/uL


(0.0-0.2) 


 


 


Sodium Level


  145mmol/L


(136-145) 


 


 


Potassium Level


  3.2mmol/L


(3.5-5.1) 


 


 


Chloride Level


  106mmol/L


() 


 


 


Carbon Dioxide Level


  28mmol/L


(21-32) 


 


 


Anion Gap 11 (6-14)  


 


Blood Urea Nitrogen 18mg/dL (7-20)  


 


Creatinine


  1.0mg/dL


(0.6-1.0) 


 


 


Estimated GFR


(Cockcroft-Gault) 63.3 


  


 


 


Glucose Level


  109mg/dL


(70-99) 


 


 


Lactic Acid Level


  2.7mmol/L


(0.4-2.0) 


 


 


Calcium Level


  9.6mg/dL


(8.5-10.1) 


 


 


Total Bilirubin


  0.5mg/dL


(0.2-1.0) 


 


 


Direct Bilirubin


  0.2mg/dL


(0.0-0.2) 


 


 


Aspartate Amino Transf


(AST/SGOT) 26U/L (15-37) 


  


 


 


Alanine Aminotransferase


(ALT/SGPT) 25U/L (14-59) 


  


 


 


Alkaline Phosphatase 65U/L ()  


 


Creatine Kinase


  214U/L


() 


 


 


Troponin I Quantitative


  0.666ng/mL


(0.000-0.055) 0.562ng/mL


(0.000-0.055)


 


NT-Pro-B-Type Natriuretic


Peptide 18448fv/mL


(0-449) 


 


 


Total Protein


  7.2g/dL


(6.4-8.2) 


 


 


Albumin


  3.5g/dL


(3.4-5.0) 


 


 


Lipase 74U/L ()  








Medications





 Current Medications


Aspirin (Children'S Aspirin) 324 mg 1X  ONCE PO  Last administered on 3/17/17at 

00:50;  Start 3/17/17 at 00:30;  Stop 3/17/17 at 00:31;  Status DC


Furosemide (Lasix) 40 mg 1X  ONCE IVP  Last administered on 3/17/17at 00:49;  

Start 3/17/17 at 00:30;  Stop 3/17/17 at 00:31;  Status DC


Ondansetron HCl (Zofran) 4 mg PRN Q8HRS  PRN IV NAUSEA/VOMITING;  Start 3/17/17 

at 00:15;  Stop 3/18/17 at 00:14


Morphine Sulfate 2 mg PRN Q2HR  PRN IV SEVERE PAIN;  Start 3/17/17 at 00:15;  

Stop 3/18/17 at 00:14


Potassium Chloride (Klor-Con) 40 meq 1X  ONCE PO  Last administered on 3/17/

17at 00:50;  Start 3/17/17 at 00:30;  Stop 3/17/17 at 00:31;  Status DC


Albuterol/ Ipratropium (Duoneb) 3 ml 1X  ONCE NEB  Last administered on 3/17/

17at 01:20;  Start 3/17/17 at 01:30;  Stop 3/17/17 at 01:31;  Status DC


Methylprednisolone Sodium Succinate (Solu-Medrol 125mg Vial) 125 mg 1X  ONCE IV

  Last administered on 3/17/17at 01:50;  Start 3/17/17 at 01:30;  Stop 3/17/17 

at 01:31;  Status DC


Info (Do NOT chart on this placeholder) 1 each PRN 1X  PRN MC SEE COMMENTS;  

Start 3/17/17 at 06:15;  Status UNV


Pneumococcal Polyvalent Vaccine (Do NOT chart on this placeholder) 1 each PRN 

1X  PRN MC SEE COMMENTS;  Start 3/17/17 at 06:15;  Status UNV


Influenza Virus Vaccine Quadrival (Fluarix Quad [id number] Syringe) 0.5 ml ONCE 

ONCE VAX IM ;  Start 3/17/17 at 09:00;  Stop 3/17/17 at 09:01


Pneumococcal Polyvalent Vaccine (Pneumovax 23) 0.5 ml ONCE ONCE VAX IM ;  Start 

3/17/17 at 09:00;  Stop 3/17/17 at 09:01





Active Scripts


Active


Reported


No Known Medications Prior To Admisstion (Info)  Each 1 Each 


Vitals/I & O





 Vital Sign - Last 24 Hours








 3/16/17 3/17/17 3/17/17 3/17/17





 21:52 01:18 01:31 02:05


 


Temp 98.0   97.4





 98.0   97.4


 


Pulse 85  76 60


 


Resp 20  22 18


 


B/P 98/63   155/101


 


Pulse Ox 99 95 99 94


 


O2 Delivery Room Air NonRebreather Mask NonRebreather Mask NonRebreather Mask


 


O2 Flow Rate  15.0 15 15.0


 


    





    





 3/17/17 3/17/17 3/17/17 3/17/17





 04:01 04:45 06:40 06:41


 


Pulse  79  


 


Resp  22 22 22


 


Pulse Ox  100 100 100


 


O2 Delivery Nasal Cannula Nasal Cannula Nasal Cannula Nasal Cannula


 


O2 Flow Rate 6.0 6.0 4.0 2.0














SANDRA VYAS MD Mar 17, 2017 07:41

## 2017-03-18 VITALS — SYSTOLIC BLOOD PRESSURE: 97 MMHG | DIASTOLIC BLOOD PRESSURE: 66 MMHG

## 2017-03-18 VITALS — DIASTOLIC BLOOD PRESSURE: 64 MMHG | SYSTOLIC BLOOD PRESSURE: 94 MMHG

## 2017-03-18 VITALS — SYSTOLIC BLOOD PRESSURE: 135 MMHG | DIASTOLIC BLOOD PRESSURE: 88 MMHG

## 2017-03-18 VITALS — DIASTOLIC BLOOD PRESSURE: 69 MMHG | SYSTOLIC BLOOD PRESSURE: 140 MMHG

## 2017-03-18 VITALS — DIASTOLIC BLOOD PRESSURE: 82 MMHG | SYSTOLIC BLOOD PRESSURE: 120 MMHG

## 2017-03-18 VITALS — SYSTOLIC BLOOD PRESSURE: 132 MMHG | DIASTOLIC BLOOD PRESSURE: 66 MMHG

## 2017-03-18 LAB
ANION GAP SERPL CALC-SCNC: 8 MMOL/L (ref 6–14)
BASOPHILS # BLD AUTO: 0 X10^3/UL (ref 0–0.2)
BASOPHILS NFR BLD: 0 % (ref 0–3)
BUN SERPL-MCNC: 21 MG/DL (ref 7–20)
CALCIUM SERPL-MCNC: 9.2 MG/DL (ref 8.5–10.1)
CHLORIDE SERPL-SCNC: 108 MMOL/L (ref 98–107)
CO2 SERPL-SCNC: 31 MMOL/L (ref 21–32)
CREAT SERPL-MCNC: 1 MG/DL (ref 0.6–1)
EOSINOPHIL NFR BLD: 0 % (ref 0–3)
ERYTHROCYTE [DISTWIDTH] IN BLOOD BY AUTOMATED COUNT: 13.7 % (ref 11.5–14.5)
GFR SERPLBLD BASED ON 1.73 SQ M-ARVRAT: 63.3 ML/MIN
GLUCOSE SERPL-MCNC: 110 MG/DL (ref 70–99)
HCT VFR BLD CALC: 34.4 % (ref 36–47)
HGB BLD-MCNC: 11 G/DL (ref 12–15.5)
LYMPHOCYTES # BLD: 0.8 X10^3/UL (ref 1–4.8)
LYMPHOCYTES NFR BLD AUTO: 10 % (ref 24–48)
MCH RBC QN AUTO: 29 PG (ref 25–35)
MCHC RBC AUTO-ENTMCNC: 32 G/DL (ref 31–37)
MCV RBC AUTO: 92 FL (ref 79–100)
MONOCYTES NFR BLD: 11 % (ref 0–9)
NEUTROPHILS NFR BLD AUTO: 79 % (ref 31–73)
PLATELET # BLD AUTO: 159 X10^3/UL (ref 140–400)
POTASSIUM SERPL-SCNC: 3.7 MMOL/L (ref 3.5–5.1)
RBC # BLD AUTO: 3.74 X10^6/UL (ref 3.5–5.4)
SODIUM SERPL-SCNC: 147 MMOL/L (ref 136–145)
WBC # BLD AUTO: 8 X10^3/UL (ref 4–11)

## 2017-03-18 RX ADMIN — LISINOPRIL SCH MG: 2.5 TABLET ORAL at 09:09

## 2017-03-18 RX ADMIN — ALBUTEROL SULFATE SCH MG: 108 AEROSOL, METERED RESPIRATORY (INHALATION) at 15:09

## 2017-03-18 RX ADMIN — INSULIN ASPART SCH UNITS: 100 INJECTION, SOLUTION INTRAVENOUS; SUBCUTANEOUS at 17:00

## 2017-03-18 RX ADMIN — FUROSEMIDE SCH MG: 10 INJECTION, SOLUTION INTRAMUSCULAR; INTRAVENOUS at 13:59

## 2017-03-18 RX ADMIN — FUROSEMIDE SCH MG: 10 INJECTION, SOLUTION INTRAMUSCULAR; INTRAVENOUS at 09:09

## 2017-03-18 RX ADMIN — CARVEDILOL SCH MG: 3.12 TABLET, FILM COATED ORAL at 09:09

## 2017-03-18 RX ADMIN — ALBUTEROL SULFATE SCH MG: 108 AEROSOL, METERED RESPIRATORY (INHALATION) at 20:41

## 2017-03-18 RX ADMIN — BUDESONIDE SCH MG: 0.5 SUSPENSION RESPIRATORY (INHALATION) at 20:41

## 2017-03-18 RX ADMIN — CARVEDILOL SCH MG: 3.12 TABLET, FILM COATED ORAL at 17:07

## 2017-03-18 NOTE — PDOC
PROGRESS NOTES


Subjective


Subjective


She is up in chair and she feels like she is better but still dyspneic when 

talking, wearing NC O2, feet up but still swollen, she is able to get to the 

bathroom but with assist





Objective


Objective





 Vital Signs








  Date Time  Temp Pulse Resp B/P Pulse Ox O2 Delivery O2 Flow Rate FiO2


 


3/18/17 10:53 98.0 96 24 135/88 99 Nasal Cannula 2.0 





 98.0       














 Intake and Output 


 


 3/18/17





 07:00


 


Intake Total 560 ml


 


Balance 560 ml


 


 


 


Intake Oral 560 ml


 


# Voids 2











Physical Exam


Abdomen:  Normal bowel sounds, Soft


Heart:  Regular rate


Extremities:  No clubbing, No cyanosis, Other (bilateral 3+ LE edema, skin 

starting to wrinkle, no ulcers, stasis dermatitis changes present without 

cellulitis)


General:  Alert, Cooperative, mild distress


HEENT:  Atraumatic


Lungs:  Other (expiratory wheezes, diminished throughout)


MUSCULOSKELETAL:  No joint tenderness


Neck:  Supple


Psych/Mental Status:  Mental status NL


Skin:  No significant lesion





Assessment


Assessment


 Problems


Medical Problems:


(1) CHF (congestive heart failure)


Status: Acute  





(2) Elevated glucose - A1c baljinder, will monitor and give SSI if needed


Status: Acute  





(3) Pedal edema - still present


Status: Acute  





(4) Pulmonary edema - being diuresed





(5) possible NSTEMI per enzymes - cardiology following





(6) valvular heart disease - had echo





(7) COPD - getting albuterol nebulized, add budesinide


Status: Acute  








Plan


Plan of Care


as above





Comment


Review of Relevant


I have reviewed the following items wally (where applicable) has been applied.


Labs





Laboratory Tests








Test


  3/16/17


23:15 3/17/17


06:43 3/17/17


09:27 3/17/17


12:05


 


White Blood Count


  7.5x10^3/uL


(4.0-11.0) 


  


  


 


 


Red Blood Count


  3.92x10^6/uL


(3.50-5.40) 


  


  


 


 


Hemoglobin


  11.3g/dL


(12.0-15.5) 


  


  


 


 


Hematocrit


  35.9%


(36.0-47.0) 


  


  


 


 


Mean Corpuscular Volume 92fL ()    


 


Mean Corpuscular Hemoglobin 29pg (25-35)    


 


Mean Corpuscular Hemoglobin


Concent 32g/dL (31-37) 


  


  


  


 


 


Red Cell Distribution Width


  13.2%


(11.5-14.5) 


  


  


 


 


Platelet Count


  178x10^3/uL


(140-400) 


  


  


 


 


Neutrophils (%) (Auto) 84% (31-73)    


 


Lymphocytes (%) (Auto) 7% (24-48)    


 


Monocytes (%) (Auto) 8% (0-9)    


 


Eosinophils (%) (Auto) 1% (0-3)    


 


Basophils (%) (Auto) 0% (0-3)    


 


Neutrophils # (Auto)


  6.3x10^3uL


(1.8-7.7) 


  


  


 


 


Lymphocytes # (Auto)


  0.5x10^3/uL


(1.0-4.8) 


  


  


 


 


Monocytes # (Auto)


  0.6x10^3/uL


(0.0-1.1) 


  


  


 


 


Eosinophils # (Auto)


  0.1x10^3/uL


(0.0-0.7) 


  


  


 


 


Basophils # (Auto)


  0.0x10^3/uL


(0.0-0.2) 


  


  


 


 


Sodium Level


  145mmol/L


(136-145) 


  


  


 


 


Potassium Level


  3.2mmol/L


(3.5-5.1) 


  


  


 


 


Chloride Level


  106mmol/L


() 


  


  


 


 


Carbon Dioxide Level


  28mmol/L


(21-32) 


  


  


 


 


Anion Gap 11 (6-14)    


 


Blood Urea Nitrogen 18mg/dL (7-20)    


 


Creatinine


  1.0mg/dL


(0.6-1.0) 


  


  


 


 


Estimated GFR


(Cockcroft-Gault) 63.3 


  


  


  


 


 


Glucose Level


  109mg/dL


(70-99) 


  


  


 


 


Lactic Acid Level


  2.7mmol/L


(0.4-2.0) 


  


  2.4mmol/L


(0.4-2.0)


 


Calcium Level


  9.6mg/dL


(8.5-10.1) 


  


  


 


 


Total Bilirubin


  0.5mg/dL


(0.2-1.0) 


  


  


 


 


Direct Bilirubin


  0.2mg/dL


(0.0-0.2) 


  


  


 


 


Aspartate Amino Transf


(AST/SGOT) 26U/L (15-37) 


  


  


  


 


 


Alanine Aminotransferase


(ALT/SGPT) 25U/L (14-59) 


  


  


  


 


 


Alkaline Phosphatase 65U/L ()    


 


Creatine Kinase


  214U/L


() 


  


  


 


 


Troponin I Quantitative


  0.666ng/mL


(0.000-0.055) 0.562ng/mL


(0.000-0.055) 


  0.515ng/mL


(0.000-0.055)


 


NT-Pro-B-Type Natriuretic


Peptide 96880xs/mL


(0-449) 


  


  


 


 


Total Protein


  7.2g/dL


(6.4-8.2) 


  


  


 


 


Albumin


  3.5g/dL


(3.4-5.0) 


  


  


 


 


Lipase 74U/L ()    


 


Hemoglobin A1c  5.6% (4.8-5.6)   


 


Thyroid Stimulating Hormone


(TSH) 


  1.520uIU/mL


(0.358-3.74) 


  


 


 


Glucose (Fingerstick)


  


  


  153mg/dL


(70-99) 


 


 


Magnesium Level


  


  


  


  1.9mg/dL


(1.8-2.4)














Test


  3/17/17


17:45 3/18/17


03:45 


  


 


 


Urine Collection Type Unknown    


 


Urine Color Yellow    


 


Urine Clarity Clear    


 


Urine pH 5.0    


 


Urine Specific Gravity 1.015    


 


Urine Protein


  Negativemg/dL


(NEG-TRACE) 


  


  


 


 


Urine Glucose (UA)


  Negativemg/dL


(NEG) 


  


  


 


 


Urine Ketones (Stick)


  Negativemg/dL


(NEG) 


  


  


 


 


Urine Blood Small (NEG)    


 


Urine Nitrite Negative (NEG)    


 


Urine Bilirubin Negative (NEG)    


 


Urine Urobilinogen Dipstick


  0.2mg/dL (0.2


mg/dL) 


  


  


 


 


Urine Leukocyte Esterase Negative (NEG)    


 


Urine RBC Rare/HPF (0-2)    


 


Urine WBC 1-4/HPF (0-4)    


 


Urine Squamous Epithelial


Cells Many/LPF 


  


  


  


 


 


Urine Bacteria


  Few/HPF


(0-FEW) 


  


  


 


 


Urine Hyaline Casts Occasional/HPF    


 


Urine Mucus Mod/LPF    


 


White Blood Count


  


  8.0x10^3/uL


(4.0-11.0) 


  


 


 


Red Blood Count


  


  3.74x10^6/uL


(3.50-5.40) 


  


 


 


Hemoglobin


  


  11.0g/dL


(12.0-15.5) 


  


 


 


Hematocrit


  


  34.4%


(36.0-47.0) 


  


 


 


Mean Corpuscular Volume  92fL ()   


 


Mean Corpuscular Hemoglobin  29pg (25-35)   


 


Mean Corpuscular Hemoglobin


Concent 


  32g/dL (31-37) 


  


  


 


 


Red Cell Distribution Width


  


  13.7%


(11.5-14.5) 


  


 


 


Platelet Count


  


  159x10^3/uL


(140-400) 


  


 


 


Neutrophils (%) (Auto)  79% (31-73)   


 


Lymphocytes (%) (Auto)  10% (24-48)   


 


Monocytes (%) (Auto)  11% (0-9)   


 


Eosinophils (%) (Auto)  0% (0-3)   


 


Basophils (%) (Auto)  0% (0-3)   


 


Neutrophils # (Auto)


  


  6.3x10^3uL


(1.8-7.7) 


  


 


 


Lymphocytes # (Auto)


  


  0.8x10^3/uL


(1.0-4.8) 


  


 


 


Monocytes # (Auto)


  


  0.9x10^3/uL


(0.0-1.1) 


  


 


 


Eosinophils # (Auto)


  


  0.0x10^3/uL


(0.0-0.7) 


  


 


 


Basophils # (Auto)


  


  0.0x10^3/uL


(0.0-0.2) 


  


 


 


Sodium Level


  


  147mmol/L


(136-145) 


  


 


 


Potassium Level


  


  3.7mmol/L


(3.5-5.1) 


  


 


 


Chloride Level


  


  108mmol/L


() 


  


 


 


Carbon Dioxide Level


  


  31mmol/L


(21-32) 


  


 


 


Anion Gap  8 (6-14)   


 


Blood Urea Nitrogen  21mg/dL (7-20)   


 


Creatinine


  


  1.0mg/dL


(0.6-1.0) 


  


 


 


Estimated GFR


(Cockcroft-Gault) 


  63.3 


  


  


 


 


Glucose Level


  


  110mg/dL


(70-99) 


  


 


 


Calcium Level


  


  9.2mg/dL


(8.5-10.1) 


  


 








Laboratory Tests








Test


  3/17/17


17:45 3/18/17


03:45


 


Urine Collection Type Unknown  


 


Urine Color Yellow  


 


Urine Clarity Clear  


 


Urine pH 5.0  


 


Urine Specific Gravity 1.015  


 


Urine Protein


  Negativemg/dL


(NEG-TRACE) 


 


 


Urine Glucose (UA)


  Negativemg/dL


(NEG) 


 


 


Urine Ketones (Stick)


  Negativemg/dL


(NEG) 


 


 


Urine Blood Small (NEG)  


 


Urine Nitrite Negative (NEG)  


 


Urine Bilirubin Negative (NEG)  


 


Urine Urobilinogen Dipstick


  0.2mg/dL (0.2


mg/dL) 


 


 


Urine Leukocyte Esterase Negative (NEG)  


 


Urine RBC Rare/HPF (0-2)  


 


Urine WBC 1-4/HPF (0-4)  


 


Urine Squamous Epithelial


Cells Many/LPF 


  


 


 


Urine Bacteria


  Few/HPF


(0-FEW) 


 


 


Urine Hyaline Casts Occasional/HPF  


 


Urine Mucus Mod/LPF  


 


White Blood Count


  


  8.0x10^3/uL


(4.0-11.0)


 


Red Blood Count


  


  3.74x10^6/uL


(3.50-5.40)


 


Hemoglobin


  


  11.0g/dL


(12.0-15.5)


 


Hematocrit


  


  34.4%


(36.0-47.0)


 


Mean Corpuscular Volume  92fL () 


 


Mean Corpuscular Hemoglobin  29pg (25-35) 


 


Mean Corpuscular Hemoglobin


Concent 


  32g/dL (31-37) 


 


 


Red Cell Distribution Width


  


  13.7%


(11.5-14.5)


 


Platelet Count


  


  159x10^3/uL


(140-400)


 


Neutrophils (%) (Auto)  79% (31-73) 


 


Lymphocytes (%) (Auto)  10% (24-48) 


 


Monocytes (%) (Auto)  11% (0-9) 


 


Eosinophils (%) (Auto)  0% (0-3) 


 


Basophils (%) (Auto)  0% (0-3) 


 


Neutrophils # (Auto)


  


  6.3x10^3uL


(1.8-7.7)


 


Lymphocytes # (Auto)


  


  0.8x10^3/uL


(1.0-4.8)


 


Monocytes # (Auto)


  


  0.9x10^3/uL


(0.0-1.1)


 


Eosinophils # (Auto)


  


  0.0x10^3/uL


(0.0-0.7)


 


Basophils # (Auto)


  


  0.0x10^3/uL


(0.0-0.2)


 


Sodium Level


  


  147mmol/L


(136-145)


 


Potassium Level


  


  3.7mmol/L


(3.5-5.1)


 


Chloride Level


  


  108mmol/L


()


 


Carbon Dioxide Level


  


  31mmol/L


(21-32)


 


Anion Gap  8 (6-14) 


 


Blood Urea Nitrogen  21mg/dL (7-20) 


 


Creatinine


  


  1.0mg/dL


(0.6-1.0)


 


Estimated GFR


(Cockcroft-Gault) 


  63.3 


 


 


Glucose Level


  


  110mg/dL


(70-99)


 


Calcium Level


  


  9.2mg/dL


(8.5-10.1)








Medications





 Current Medications


Aspirin (Children'S Aspirin) 324 mg 1X  ONCE PO  Last administered on 3/17/17at 

00:50;  Start 3/17/17 at 00:30;  Stop 3/17/17 at 00:31;  Status DC


Furosemide (Lasix) 40 mg 1X  ONCE IVP  Last administered on 3/17/17at 00:49;  

Start 3/17/17 at 00:30;  Stop 3/17/17 at 00:31;  Status DC


Ondansetron HCl (Zofran) 4 mg PRN Q8HRS  PRN IV NAUSEA/VOMITING;  Start 3/17/17 

at 00:15;  Stop 3/18/17 at 00:14;  Status DC


Morphine Sulfate 2 mg PRN Q2HR  PRN IV SEVERE PAIN;  Start 3/17/17 at 00:15;  

Stop 3/18/17 at 00:14;  Status DC


Potassium Chloride (Klor-Con) 40 meq 1X  ONCE PO  Last administered on 3/17/

17at 00:50;  Start 3/17/17 at 00:30;  Stop 3/17/17 at 00:31;  Status DC


Albuterol/ Ipratropium (Duoneb) 3 ml 1X  ONCE NEB  Last administered on 3/17/

17at 01:20;  Start 3/17/17 at 01:30;  Stop 3/17/17 at 01:31;  Status DC


Methylprednisolone Sodium Succinate (Solu-Medrol 125mg Vial) 125 mg 1X  ONCE IV

  Last administered on 3/17/17at 01:50;  Start 3/17/17 at 01:30;  Stop 3/17/17 

at 01:31;  Status DC


Info (Do NOT chart on this placeholder) 1 each PRN 1X  PRN MC SEE COMMENTS;  

Start 3/17/17 at 06:15;  Status UNV


Pneumococcal Polyvalent Vaccine (Do NOT chart on this placeholder) 1 each PRN 

1X  PRN MC SEE COMMENTS;  Start 3/17/17 at 06:15;  Status UNV


Influenza Virus Vaccine Quadrival (Fluarix Quad [id number] Syringe) 0.5 ml ONCE 

ONCE VAX IM ;  Start 3/17/17 at 09:00;  Stop 3/17/17 at 09:01;  Status DC


Pneumococcal Polyvalent Vaccine (Pneumovax 23) 0.5 ml ONCE ONCE VAX IM ;  Start 

3/17/17 at 09:00;  Stop 3/17/17 at 09:01;  Status DC


Albuterol Sulfate (Ventolin Neb Soln) 2.5 mg RTQID NEB  Last administered on 3/

17/17at 20:25;  Start 3/17/17 at 08:00


Furosemide (Lasix) 20 mg 1X  ONCE IVP  Last administered on 3/17/17at 12:34;  

Start 3/17/17 at 12:30;  Stop 3/17/17 at 12:31;  Status DC


Albuterol/ Ipratropium (Duoneb) 3 ml 1X  ONCE NEB  Last administered on 3/17/

17at 15:43;  Start 3/17/17 at 12:30;  Stop 3/17/17 at 12:31;  Status DC


Potassium Chloride (Klor-Con) 20 meq 1X  ONCE PO  Last administered on 3/17/

17at 12:35;  Start 3/17/17 at 12:00;  Stop 3/17/17 at 12:01;  Status DC


Amlodipine Besylate (Norvasc) 5 mg DAILY PO  Last administered on 3/17/17at 12:

34;  Start 3/17/17 at 12:00;  Stop 3/18/17 at 08:41;  Status DC


Furosemide (Lasix) 40 mg BID92 IVP  Last administered on 3/18/17at 09:09;  

Start 3/18/17 at 09:00


Carvedilol (Coreg) 3.125 mg BIDWMEALS PO  Last administered on 3/18/17at 09:09;

  Start 3/18/17 at 09:00


Lisinopril (Prinivil) 2.5 mg DAILY PO  Last administered on 3/18/17at 09:09;  

Start 3/18/17 at 09:00


Potassium Chloride (Klor-Con) 40 meq 1X  ONCE PO  Last administered on 3/18/

17at 09:26;  Start 3/18/17 at 08:45;  Stop 3/18/17 at 08:46;  Status DC





Active Scripts


Active


Reported


No Known Medications Prior To Admisstion (Info)  Each 1 Each 


Vitals/I & O





 Vital Sign - Last 24 Hours








 3/17/17 3/17/17 3/17/17 3/17/17





 12:34 15:00 15:46 19:40


 


Temp  97.9  97.6





  97.9  97.6


 


Pulse 94 103  87


 


Resp  24  22


 


B/P 164/108 160/100  117/76


 


Pulse Ox  95 95 92


 


O2 Delivery  Venturi Mask Nasal Cannula Nasal Cannula


 


O2 Flow Rate    2.0


 


    





    





 3/17/17 3/17/17 3/17/17 3/18/17





 20:00 20:29 22:50 02:50


 


Temp   98.2 98.1





   98.2 98.1


 


Pulse   96 87


 


Resp   20 22


 


B/P   124/65 132/66


 


Pulse Ox  94 96 99


 


O2 Delivery Nasal Cannula Nasal Cannula Nasal Cannula Nasal Cannula


 


O2 Flow Rate 2.0 2.0 2.0 2.0


 


    





    





 3/18/17 3/18/17 3/18/17 3/18/17





 07:50 08:15 09:09 09:09


 


Temp 98.2   





 98.2   


 


Pulse 87  87 87


 


Resp 22   


 


B/P 120/82  120/82 120/82


 


Pulse Ox 99   


 


O2 Delivery Nasal Cannula Nasal Cannula  


 


O2 Flow Rate 2.0 2.0  


 


    





    





 3/18/17   





 10:53   


 


Temp 98.0   





 98.0   


 


Pulse 96   


 


Resp 24   


 


B/P 135/88   


 


Pulse Ox 99   


 


O2 Delivery Nasal Cannula   


 


O2 Flow Rate 2.0   














 Intake and Output   


 


 3/17/17 3/17/17 3/18/17





 15:00 23:00 07:00


 


Intake Total  320 ml 240 ml


 


Balance  320 ml 240 ml














JUNITO HIRSCH MD Mar 18, 2017 12:17

## 2017-03-18 NOTE — PDOC
CHINEDU NOGUERA JOHNATHAN 3/18/17 0823:


CARDIO Progress Notes


Date and Time


Date of Service


3/18/17


Time of Evaluation


0820





Subjective


Subjective:  No Chest Pain, No Palpitations, No Dizziness, Other (c/o shortness 

of breath)





Vitals


Vitals





 Vital Signs








  Date Time  Temp Pulse Resp B/P Pulse Ox O2 Delivery O2 Flow Rate FiO2


 


3/18/17 02:50 98.1 87 22 132/66 99 Nasal Cannula 2.0 





 98.1       








Weight


Weight [ ]





Input and Output


Intake and Output











 Intake and Output 


 


 3/18/17





 07:00


 


Intake Total 560 ml


 


Balance 560 ml


 


 


 


Intake Oral 560 ml


 


# Voids 2











Laboratory


Labs





Laboratory Tests








Test


  3/17/17


09:27 3/17/17


12:05 3/17/17


17:45 3/18/17


03:45


 


Glucose (Fingerstick)


  153mg/dL


(70-99) 


  


  


 


 


Lactic Acid Level


  


  2.4mmol/L


(0.4-2.0) 


  


 


 


Magnesium Level


  


  1.9mg/dL


(1.8-2.4) 


  


 


 


Troponin I Quantitative


  


  0.515ng/mL


(0.000-0.055) 


  


 


 


Urine Collection Type   Unknown  


 


Urine Color   Yellow  


 


Urine Clarity   Clear  


 


Urine pH   5.0  


 


Urine Specific Gravity   1.015  


 


Urine Protein


  


  


  Negativemg/dL


(NEG-TRACE) 


 


 


Urine Glucose (UA)


  


  


  Negativemg/dL


(NEG) 


 


 


Urine Ketones (Stick)


  


  


  Negativemg/dL


(NEG) 


 


 


Urine Blood   Small (NEG)  


 


Urine Nitrite   Negative (NEG)  


 


Urine Bilirubin   Negative (NEG)  


 


Urine Urobilinogen Dipstick


  


  


  0.2mg/dL (0.2


mg/dL) 


 


 


Urine Leukocyte Esterase   Negative (NEG)  


 


Urine RBC   Rare/HPF (0-2)  


 


Urine WBC   1-4/HPF (0-4)  


 


Urine Squamous Epithelial


Cells 


  


  Many/LPF 


  


 


 


Urine Bacteria


  


  


  Few/HPF


(0-FEW) 


 


 


Urine Hyaline Casts   Occasional/HPF  


 


Urine Mucus   Mod/LPF  


 


White Blood Count


  


  


  


  8.0x10^3/uL


(4.0-11.0)


 


Red Blood Count


  


  


  


  3.74x10^6/uL


(3.50-5.40)


 


Hemoglobin


  


  


  


  11.0g/dL


(12.0-15.5)


 


Hematocrit


  


  


  


  34.4%


(36.0-47.0)


 


Mean Corpuscular Volume    92fL () 


 


Mean Corpuscular Hemoglobin    29pg (25-35) 


 


Mean Corpuscular Hemoglobin


Concent 


  


  


  32g/dL (31-37) 


 


 


Red Cell Distribution Width


  


  


  


  13.7%


(11.5-14.5)


 


Platelet Count


  


  


  


  159x10^3/uL


(140-400)


 


Neutrophils (%) (Auto)    79% (31-73) 


 


Lymphocytes (%) (Auto)    10% (24-48) 


 


Monocytes (%) (Auto)    11% (0-9) 


 


Eosinophils (%) (Auto)    0% (0-3) 


 


Basophils (%) (Auto)    0% (0-3) 


 


Neutrophils # (Auto)


  


  


  


  6.3x10^3uL


(1.8-7.7)


 


Lymphocytes # (Auto)


  


  


  


  0.8x10^3/uL


(1.0-4.8)


 


Monocytes # (Auto)


  


  


  


  0.9x10^3/uL


(0.0-1.1)


 


Eosinophils # (Auto)


  


  


  


  0.0x10^3/uL


(0.0-0.7)


 


Basophils # (Auto)


  


  


  


  0.0x10^3/uL


(0.0-0.2)


 


Sodium Level


  


  


  


  147mmol/L


(136-145)


 


Potassium Level


  


  


  


  3.7mmol/L


(3.5-5.1)


 


Chloride Level


  


  


  


  108mmol/L


()


 


Carbon Dioxide Level


  


  


  


  31mmol/L


(21-32)


 


Anion Gap    8 (6-14) 


 


Blood Urea Nitrogen    21mg/dL (7-20) 


 


Creatinine


  


  


  


  1.0mg/dL


(0.6-1.0)


 


Estimated GFR


(Cockcroft-Gault) 


  


  


  63.3 


 


 


Glucose Level


  


  


  


  110mg/dL


(70-99)


 


Calcium Level


  


  


  


  9.2mg/dL


(8.5-10.1)











Physical Exam


HEENT:  Neck Supple W Full Motion


Chest:  Symmetric


LUNGS:  Other (bibasilar crackles, no wheezing present)


Heart:  S1S2, other (tele SR with PAC's. )


Abdomen:  Soft N/T


Extremities:  Other (3+ bilateral LE edema with chronic venous stasis insuff)


Neurology:  alert, oriented, follow commands





Assessment


Assessment


1.  Acute systolic heart failure


2.  NSTEMI 


3.  Moderate-severe aortic stenosis 


4.  Acute respiratory failure


5.  HTN


6.  Chronic venous insufficiency


7.  Dementia














Recommendations





Echo revealed moderate global hypokinesis of LV with an EF of 35% and 

polyvalvular disease; no previous for comparison


? candidate for aggressive evaluation/intervention; will need to discuss with 

family to determine preferences/goals of care


d/c Norvasc. Start low-dose BB and ACE for optimization as BP allows


IV diuresis. Replace K. Consider milrinone gtt if no significant improvement in 

symptoms.





REGINA MAGANA MD 3/18/17 1143:


CARDIO Progress Notes


Plan


Plan


Pt. seen and examined. Agree with above NP note. 


no acute events overnight. 


appears to be mildly dyspnea. 3+ pitting edema. 


I had a long conversation with patient's DPOA about goals of care. The daughter 

(melanie) wishes to have aggressive therapy, this would include possible LHC + 

aortic valvuloplasty if needed. 


Will discuss with Dr. Dhillon. Plan for HF optimization over the weekend and 

LHC on monday if possible.








CHINEDU NOGUERA Mar 18, 2017 08:23


REGINA MAGANA MD Mar 18, 2017 11:43

## 2017-03-19 VITALS — DIASTOLIC BLOOD PRESSURE: 68 MMHG | SYSTOLIC BLOOD PRESSURE: 112 MMHG

## 2017-03-19 VITALS — DIASTOLIC BLOOD PRESSURE: 73 MMHG | SYSTOLIC BLOOD PRESSURE: 121 MMHG

## 2017-03-19 VITALS — DIASTOLIC BLOOD PRESSURE: 67 MMHG | SYSTOLIC BLOOD PRESSURE: 110 MMHG

## 2017-03-19 VITALS — DIASTOLIC BLOOD PRESSURE: 50 MMHG | SYSTOLIC BLOOD PRESSURE: 91 MMHG

## 2017-03-19 VITALS — DIASTOLIC BLOOD PRESSURE: 57 MMHG | SYSTOLIC BLOOD PRESSURE: 96 MMHG

## 2017-03-19 VITALS — DIASTOLIC BLOOD PRESSURE: 64 MMHG | SYSTOLIC BLOOD PRESSURE: 101 MMHG

## 2017-03-19 RX ADMIN — BUDESONIDE SCH MG: 0.5 SUSPENSION RESPIRATORY (INHALATION) at 19:17

## 2017-03-19 RX ADMIN — ALBUTEROL SULFATE SCH MG: 108 AEROSOL, METERED RESPIRATORY (INHALATION) at 11:31

## 2017-03-19 RX ADMIN — CEFPODOXIME PROXETIL SCH MG: 100 TABLET, FILM COATED ORAL at 11:49

## 2017-03-19 RX ADMIN — BUDESONIDE SCH MG: 0.5 SUSPENSION RESPIRATORY (INHALATION) at 07:44

## 2017-03-19 RX ADMIN — ALBUTEROL SULFATE SCH MG: 108 AEROSOL, METERED RESPIRATORY (INHALATION) at 19:17

## 2017-03-19 RX ADMIN — FUROSEMIDE SCH MG: 10 INJECTION, SOLUTION INTRAMUSCULAR; INTRAVENOUS at 14:02

## 2017-03-19 RX ADMIN — CEFPODOXIME PROXETIL SCH MG: 100 TABLET, FILM COATED ORAL at 20:37

## 2017-03-19 RX ADMIN — FUROSEMIDE SCH MG: 10 INJECTION, SOLUTION INTRAMUSCULAR; INTRAVENOUS at 08:01

## 2017-03-19 RX ADMIN — ALBUTEROL SULFATE SCH MG: 108 AEROSOL, METERED RESPIRATORY (INHALATION) at 07:44

## 2017-03-19 RX ADMIN — ALBUTEROL SULFATE SCH MG: 108 AEROSOL, METERED RESPIRATORY (INHALATION) at 15:55

## 2017-03-19 RX ADMIN — LISINOPRIL SCH MG: 2.5 TABLET ORAL at 08:01

## 2017-03-19 RX ADMIN — CARVEDILOL SCH MG: 3.12 TABLET, FILM COATED ORAL at 08:01

## 2017-03-19 RX ADMIN — INSULIN ASPART SCH UNITS: 100 INJECTION, SOLUTION INTRAVENOUS; SUBCUTANEOUS at 08:00

## 2017-03-19 RX ADMIN — CARVEDILOL SCH MG: 3.12 TABLET, FILM COATED ORAL at 17:24

## 2017-03-19 RX ADMIN — GUAIFENESIN SCH MG: 600 TABLET, EXTENDED RELEASE ORAL at 20:37

## 2017-03-19 RX ADMIN — GUAIFENESIN SCH MG: 600 TABLET, EXTENDED RELEASE ORAL at 11:49

## 2017-03-19 NOTE — PDOC
PROGRESS NOTES


Subjective


Subjective


Weak productive cough, still on O2, edema no better, albumin has been normal, 

still on IV lasix, blood sugars normal, A1C normal, EF 35% with LVH, global 

hypokinesis and calcific AS





Objective


Objective





 Vital Signs








  Date Time  Temp Pulse Resp B/P Pulse Ox O2 Delivery O2 Flow Rate FiO2


 


3/19/17 11:09 98.0 75  91/50 97 Nasal Cannula 2.0 





 98.0       


 


3/19/17 07:37   24     














 Intake and Output 


 


 3/19/17





 07:00


 


Intake Total 60 ml


 


Output Total 100 ml


 


Balance -40 ml


 


 


 


Intake Oral 60 ml


 


Output Urine Total 100 ml


 


# Voids 9











Physical Exam


Abdomen:  Soft


Heart:  Other (sinus per monitor with frequent PACs)


Extremities:  No clubbing, No cyanosis, Other (edema unchanged, 2-3+)


HEENT:  Atraumatic, EOMI, Mucous membr. moist/pink


Lungs:  Other (poor air movement, weak cough, productive sounding)


MUSCULOSKELETAL:  No joint tenderness


Neck:  Supple, No JVD


Neuro:  Other (weak voice, weak cough)


Psych/Mental Status:  Mental status NL


Skin:  No breakdown





Assessment


Assessment


 Problems


Medical Problems:


(1) CHF (congestive heart failure) with calcific aortic stenosis, global 

hypokenesis and EF of 35%, continue carvedilol, IV lasix, supplemental oxygen


Status: Acute  





(2) Elevated glucose - normal A1C, fingersticks normal so this was isolated, no 

diabetes but continue diabetic diet


Status: Acute  





(3) Pedal edema - slow to improve, albumin has been normal though


Status: Acute  





(4) Pulmonary edema- may also have bronchitis, add cefpodoxime, check sputum, f/

u CXR





(5) weakness, debility - PT eval, she may need SNU


Status: Acute  








Plan


Plan of Care


as above





Comment


Review of Relevant


I have reviewed the following items wally (where applicable) has been applied.


Labs





Laboratory Tests








Test


  3/17/17


12:05 3/17/17


17:45 3/18/17


03:45 3/18/17


16:46


 


Lactic Acid Level


  2.4mmol/L


(0.4-2.0) 


  


  


 


 


Magnesium Level


  1.9mg/dL


(1.8-2.4) 


  


  


 


 


Troponin I Quantitative


  0.515ng/mL


(0.000-0.055) 


  


  


 


 


Urine Collection Type  Unknown   


 


Urine Color  Yellow   


 


Urine Clarity  Clear   


 


Urine pH  5.0   


 


Urine Specific Gravity  1.015   


 


Urine Protein


  


  Negativemg/dL


(NEG-TRACE) 


  


 


 


Urine Glucose (UA)


  


  Negativemg/dL


(NEG) 


  


 


 


Urine Ketones (Stick)


  


  Negativemg/dL


(NEG) 


  


 


 


Urine Blood  Small (NEG)   


 


Urine Nitrite  Negative (NEG)   


 


Urine Bilirubin  Negative (NEG)   


 


Urine Urobilinogen Dipstick


  


  0.2mg/dL (0.2


mg/dL) 


  


 


 


Urine Leukocyte Esterase  Negative (NEG)   


 


Urine RBC  Rare/HPF (0-2)   


 


Urine WBC  1-4/HPF (0-4)   


 


Urine Squamous Epithelial


Cells 


  Many/LPF 


  


  


 


 


Urine Bacteria


  


  Few/HPF


(0-FEW) 


  


 


 


Urine Hyaline Casts  Occasional/HPF   


 


Urine Mucus  Mod/LPF   


 


White Blood Count


  


  


  8.0x10^3/uL


(4.0-11.0) 


 


 


Red Blood Count


  


  


  3.74x10^6/uL


(3.50-5.40) 


 


 


Hemoglobin


  


  


  11.0g/dL


(12.0-15.5) 


 


 


Hematocrit


  


  


  34.4%


(36.0-47.0) 


 


 


Mean Corpuscular Volume   92fL ()  


 


Mean Corpuscular Hemoglobin   29pg (25-35)  


 


Mean Corpuscular Hemoglobin


Concent 


  


  32g/dL (31-37) 


  


 


 


Red Cell Distribution Width


  


  


  13.7%


(11.5-14.5) 


 


 


Platelet Count


  


  


  159x10^3/uL


(140-400) 


 


 


Neutrophils (%) (Auto)   79% (31-73)  


 


Lymphocytes (%) (Auto)   10% (24-48)  


 


Monocytes (%) (Auto)   11% (0-9)  


 


Eosinophils (%) (Auto)   0% (0-3)  


 


Basophils (%) (Auto)   0% (0-3)  


 


Neutrophils # (Auto)


  


  


  6.3x10^3uL


(1.8-7.7) 


 


 


Lymphocytes # (Auto)


  


  


  0.8x10^3/uL


(1.0-4.8) 


 


 


Monocytes # (Auto)


  


  


  0.9x10^3/uL


(0.0-1.1) 


 


 


Eosinophils # (Auto)


  


  


  0.0x10^3/uL


(0.0-0.7) 


 


 


Basophils # (Auto)


  


  


  0.0x10^3/uL


(0.0-0.2) 


 


 


Sodium Level


  


  


  147mmol/L


(136-145) 


 


 


Potassium Level


  


  


  3.7mmol/L


(3.5-5.1) 


 


 


Chloride Level


  


  


  108mmol/L


() 


 


 


Carbon Dioxide Level


  


  


  31mmol/L


(21-32) 


 


 


Anion Gap   8 (6-14)  


 


Blood Urea Nitrogen   21mg/dL (7-20)  


 


Creatinine


  


  


  1.0mg/dL


(0.6-1.0) 


 


 


Estimated GFR


(Cockcroft-Gault) 


  


  63.3 


  


 


 


Glucose Level


  


  


  110mg/dL


(70-99) 


 


 


Calcium Level


  


  


  9.2mg/dL


(8.5-10.1) 


 


 


Glucose (Fingerstick)


  


  


  


  71mg/dL


(70-99)














Test


  3/18/17


20:59 3/19/17


08:03 


  


 


 


Glucose (Fingerstick)


  79mg/dL


(70-99) 106mg/dL


(70-99) 


  


 








Laboratory Tests








Test


  3/18/17


16:46 3/18/17


20:59 3/19/17


08:03


 


Glucose (Fingerstick)


  71mg/dL


(70-99) 79mg/dL


(70-99) 106mg/dL


(70-99)








Medications





 Current Medications


Aspirin (Children'S Aspirin) 324 mg 1X  ONCE PO  Last administered on 3/17/17at 

00:50;  Start 3/17/17 at 00:30;  Stop 3/17/17 at 00:31;  Status DC


Furosemide (Lasix) 40 mg 1X  ONCE IVP  Last administered on 3/17/17at 00:49;  

Start 3/17/17 at 00:30;  Stop 3/17/17 at 00:31;  Status DC


Ondansetron HCl (Zofran) 4 mg PRN Q8HRS  PRN IV NAUSEA/VOMITING;  Start 3/17/17 

at 00:15;  Stop 3/18/17 at 00:14;  Status DC


Morphine Sulfate 2 mg PRN Q2HR  PRN IV SEVERE PAIN;  Start 3/17/17 at 00:15;  

Stop 3/18/17 at 00:14;  Status DC


Potassium Chloride (Klor-Con) 40 meq 1X  ONCE PO  Last administered on 3/17/

17at 00:50;  Start 3/17/17 at 00:30;  Stop 3/17/17 at 00:31;  Status DC


Albuterol/ Ipratropium (Duoneb) 3 ml 1X  ONCE NEB  Last administered on 3/17/

17at 01:20;  Start 3/17/17 at 01:30;  Stop 3/17/17 at 01:31;  Status DC


Methylprednisolone Sodium Succinate (Solu-Medrol 125mg Vial) 125 mg 1X  ONCE IV

  Last administered on 3/17/17at 01:50;  Start 3/17/17 at 01:30;  Stop 3/17/17 

at 01:31;  Status DC


Info (Do NOT chart on this placeholder) 1 each PRN 1X  PRN MC SEE COMMENTS;  

Start 3/17/17 at 06:15;  Status UNV


Pneumococcal Polyvalent Vaccine (Do NOT chart on this placeholder) 1 each PRN 

1X  PRN MC SEE COMMENTS;  Start 3/17/17 at 06:15;  Status UNV


Influenza Virus Vaccine Quadrival (Fluarix Quad [id number] Syringe) 0.5 ml ONCE 

ONCE VAX IM ;  Start 3/17/17 at 09:00;  Stop 3/17/17 at 09:01;  Status DC


Pneumococcal Polyvalent Vaccine (Pneumovax 23) 0.5 ml ONCE ONCE VAX IM ;  Start 

3/17/17 at 09:00;  Stop 3/17/17 at 09:01;  Status DC


Albuterol Sulfate (Ventolin Neb Soln) 2.5 mg RTQID NEB  Last administered on 3/

19/17at 07:44;  Start 3/17/17 at 08:00


Furosemide (Lasix) 20 mg 1X  ONCE IVP  Last administered on 3/17/17at 12:34;  

Start 3/17/17 at 12:30;  Stop 3/17/17 at 12:31;  Status DC


Albuterol/ Ipratropium (Duoneb) 3 ml 1X  ONCE NEB  Last administered on 3/17/

17at 15:43;  Start 3/17/17 at 12:30;  Stop 3/17/17 at 12:31;  Status DC


Potassium Chloride (Klor-Con) 20 meq 1X  ONCE PO  Last administered on 3/17/

17at 12:35;  Start 3/17/17 at 12:00;  Stop 3/17/17 at 12:01;  Status DC


Amlodipine Besylate (Norvasc) 5 mg DAILY PO  Last administered on 3/17/17at 12:

34;  Start 3/17/17 at 12:00;  Stop 3/18/17 at 08:41;  Status DC


Furosemide (Lasix) 40 mg BID92 IVP  Last administered on 3/19/17at 08:01;  

Start 3/18/17 at 09:00


Carvedilol (Coreg) 3.125 mg BIDWMEALS PO  Last administered on 3/19/17at 08:01;

  Start 3/18/17 at 09:00


Lisinopril (Prinivil) 2.5 mg DAILY PO  Last administered on 3/19/17at 08:01;  

Start 3/18/17 at 09:00


Potassium Chloride (Klor-Con) 40 meq 1X  ONCE PO  Last administered on 3/18/

17at 09:26;  Start 3/18/17 at 08:45;  Stop 3/18/17 at 08:46;  Status DC


Budesonide (Pulmicort) 0.5 mg RTBID NEB  Last administered on 3/19/17at 07:44;  

Start 3/18/17 at 20:00


Insulin Aspart (Novolog) 0-5 UNITS TIDWMEALS SQ ;  Start 3/18/17 at 17:00;  

Stop 3/19/17 at 10:34;  Status DC


Dextrose 12.5 gm PRN Q15MIN  PRN IV SEE COMMENTS;  Start 3/18/17 at 12:15;  

Stop 3/19/17 at 10:34;  Status DC


Guaifenesin (Mucinex) 600 mg BID PO ;  Start 3/19/17 at 11:00


Cefpodoxime Proxetil (Vantin) 100 mg BID PO ;  Start 3/19/17 at 11:00





Active Scripts


Active


Reported


No Known Medications Prior To Admisstion (Info)  Each 1 Each 


Vitals/I & O





 Vital Sign - Last 24 Hours








 3/18/17 3/18/17 3/18/17 3/18/17





 14:13 15:09 17:07 19:05


 


Temp 98.3   97.6





 98.3   97.6


 


Pulse 88  78 71


 


Resp 24   26


 


B/P 140/69  125/67 94/64


 


Pulse Ox 98 94  96


 


O2 Delivery Nasal Cannula Nasal Cannula  Nasal Cannula


 


O2 Flow Rate 2.0 2.0  2.0


 


    





    





 3/18/17 3/18/17 3/18/17 3/18/17





 20:16 20:41 20:42 23:28


 


Temp    98.2





    98.2


 


Pulse    74


 


Resp    24


 


B/P    97/66


 


Pulse Ox  98 98 100


 


O2 Delivery Nasal Cannula Nasal Cannula Nasal Cannula Nasal Cannula


 


O2 Flow Rate 2.0 2.0 2.0 2.0


 


    





    





 3/19/17 3/19/17 3/19/17 3/19/17





 03:05 07:37 07:44 08:01


 


Temp 98.2 98.1  





 98.2 98.1  


 


Pulse 83 86  86


 


Resp 24 24  


 


B/P 110/67 121/73  121/73


 


Pulse Ox 100 99 100 


 


O2 Delivery Nasal Cannula Nasal Cannula Nasal Cannula 


 


O2 Flow Rate 2.0 2.0 3.0 


 


    





    





 3/19/17 3/19/17 3/19/17 





 08:01 08:05 11:09 


 


Temp   98.0 





   98.0 


 


Pulse 86  75 


 


B/P 121/73  91/50 


 


Pulse Ox   97 


 


O2 Delivery  Nasal Cannula Nasal Cannula 


 


O2 Flow Rate  2.0 2.0 














 Intake and Output   


 


 3/18/17 3/18/17 3/19/17





 15:00 23:00 07:00


 


Intake Total   60 ml


 


Output Total   100 ml


 


Balance   -40 ml














JUNITO HIRSCH MD Mar 19, 2017 11:24

## 2017-03-19 NOTE — RAD
Indication: Increasing cough.



Technique: Two-view chest radiograph was obtained. Comparison is from 3 days

ago.



Findings: Pleural effusions have increased slightly in size. Interstitial

prominence has improved. Heart is upper limits of normal in size. Leads

overlie the patient.



Impression:

1. Decreasing interstitial edema.

2. Slight increase in size of pleural effusions.

## 2017-03-20 VITALS — SYSTOLIC BLOOD PRESSURE: 89 MMHG | DIASTOLIC BLOOD PRESSURE: 56 MMHG

## 2017-03-20 VITALS — SYSTOLIC BLOOD PRESSURE: 104 MMHG | DIASTOLIC BLOOD PRESSURE: 78 MMHG

## 2017-03-20 VITALS — SYSTOLIC BLOOD PRESSURE: 133 MMHG | DIASTOLIC BLOOD PRESSURE: 61 MMHG

## 2017-03-20 VITALS — SYSTOLIC BLOOD PRESSURE: 105 MMHG | DIASTOLIC BLOOD PRESSURE: 61 MMHG

## 2017-03-20 VITALS — DIASTOLIC BLOOD PRESSURE: 73 MMHG | SYSTOLIC BLOOD PRESSURE: 105 MMHG

## 2017-03-20 VITALS — DIASTOLIC BLOOD PRESSURE: 68 MMHG | SYSTOLIC BLOOD PRESSURE: 102 MMHG

## 2017-03-20 RX ADMIN — LISINOPRIL SCH MG: 2.5 TABLET ORAL at 09:51

## 2017-03-20 RX ADMIN — ALBUTEROL SULFATE SCH MG: 108 AEROSOL, METERED RESPIRATORY (INHALATION) at 15:27

## 2017-03-20 RX ADMIN — BUDESONIDE SCH MG: 0.5 SUSPENSION RESPIRATORY (INHALATION) at 08:25

## 2017-03-20 RX ADMIN — GUAIFENESIN SCH MG: 600 TABLET, EXTENDED RELEASE ORAL at 09:51

## 2017-03-20 RX ADMIN — ALBUTEROL SULFATE SCH MG: 108 AEROSOL, METERED RESPIRATORY (INHALATION) at 08:24

## 2017-03-20 RX ADMIN — ALBUTEROL SULFATE SCH MG: 108 AEROSOL, METERED RESPIRATORY (INHALATION) at 19:32

## 2017-03-20 RX ADMIN — CEFPODOXIME PROXETIL SCH MG: 100 TABLET, FILM COATED ORAL at 20:37

## 2017-03-20 RX ADMIN — CARVEDILOL SCH MG: 3.12 TABLET, FILM COATED ORAL at 17:00

## 2017-03-20 RX ADMIN — BUDESONIDE SCH MG: 0.5 SUSPENSION RESPIRATORY (INHALATION) at 19:32

## 2017-03-20 RX ADMIN — CEFPODOXIME PROXETIL SCH MG: 100 TABLET, FILM COATED ORAL at 09:51

## 2017-03-20 RX ADMIN — CARVEDILOL SCH MG: 3.12 TABLET, FILM COATED ORAL at 08:00

## 2017-03-20 RX ADMIN — GUAIFENESIN SCH MG: 600 TABLET, EXTENDED RELEASE ORAL at 20:37

## 2017-03-20 RX ADMIN — ALBUTEROL SULFATE SCH MG: 108 AEROSOL, METERED RESPIRATORY (INHALATION) at 11:22

## 2017-03-20 NOTE — PDOC
PROGRESS NOTES


Subjective


Subjective


The patient is more alert today. Her shortness of breath has improved.





Objective


Objective





 Vital Signs








  Date Time  Temp Pulse Resp B/P Pulse Ox O2 Delivery O2 Flow Rate FiO2


 


3/20/17 15:29      Nasal Cannula 2.0 


 


3/20/17 14:17  78 20 102/68 97   


 


3/20/17 10:07 98.2       





 98.2       














 Intake and Output 


 


 3/20/17





 07:00


 


Intake Total 240 ml


 


Balance 240 ml


 


 


 


Intake Oral 240 ml


 


# Voids 3











Physical Exam


Abdomen:  Normal bowel sounds


Heart:  Regular rate


General:  mild distress


Lungs:  Other (mildly decreased breath sounds)





Assessment


Assessment


 Problems


Medical Problems:


(1) CHF (congestive heart failure)


Status: Acute  





(2) Elevated glucose


Status: Acute  





(3) Pedal edema


Status: Acute  





(4) Pulmonary edema


Status: Acute  


1.  Acute systolic heart failure. Echocardiogram shows decrease LV function 

with moderately severe aortic stenosis. Patient is feeling better today. We 

again discussed possible heart catheterization. Today the patient is willing to 

consider it. However she states she is unable to lay flat for any period of 

time. Therefore we will defer any catheterization today. We'll further discuss 

possible procedures with the patient in the morning. Continue on present 

medications with monitoring of lab.


2.  NSTEMI . No chest pain. Continue medical treatment. Possible 

catheterization as above.


3.  Moderate-severe aortic stenosis. Continuing medical treatment. 


4.  Acute respiratory failure. Resolved.


5.  HTN


6.  Chronic venous insufficiency


7.  Dementia





Comment


Review of Relevant


I have reviewed the following items wally (where applicable) has been applied.


Labs





Laboratory Tests








Test


  3/18/17


16:46 3/18/17


20:59 3/19/17


08:03


 


Glucose (Fingerstick)


  71mg/dL


(70-99) 79mg/dL


(70-99) 106mg/dL


(70-99)








Medications





 Current Medications


Aspirin (Children'S Aspirin) 324 mg 1X  ONCE PO  Last administered on 3/17/17at 

00:50;  Start 3/17/17 at 00:30;  Stop 3/17/17 at 00:31;  Status DC


Furosemide (Lasix) 40 mg 1X  ONCE IVP  Last administered on 3/17/17at 00:49;  

Start 3/17/17 at 00:30;  Stop 3/17/17 at 00:31;  Status DC


Ondansetron HCl (Zofran) 4 mg PRN Q8HRS  PRN IV NAUSEA/VOMITING;  Start 3/17/17 

at 00:15;  Stop 3/18/17 at 00:14;  Status DC


Morphine Sulfate 2 mg PRN Q2HR  PRN IV SEVERE PAIN;  Start 3/17/17 at 00:15;  

Stop 3/18/17 at 00:14;  Status DC


Potassium Chloride (Klor-Con) 40 meq 1X  ONCE PO  Last administered on 3/17/

17at 00:50;  Start 3/17/17 at 00:30;  Stop 3/17/17 at 00:31;  Status DC


Albuterol/ Ipratropium (Duoneb) 3 ml 1X  ONCE NEB  Last administered on 3/17/

17at 01:20;  Start 3/17/17 at 01:30;  Stop 3/17/17 at 01:31;  Status DC


Methylprednisolone Sodium Succinate (Solu-Medrol 125mg Vial) 125 mg 1X  ONCE IV

  Last administered on 3/17/17at 01:50;  Start 3/17/17 at 01:30;  Stop 3/17/17 

at 01:31;  Status DC


Info (Do NOT chart on this placeholder) 1 each PRN 1X  PRN MC SEE COMMENTS;  

Start 3/17/17 at 06:15;  Status UNV


Pneumococcal Polyvalent Vaccine (Do NOT chart on this placeholder) 1 each PRN 

1X  PRN MC SEE COMMENTS;  Start 3/17/17 at 06:15;  Status UNV


Influenza Virus Vaccine Quadrival (Fluarix Quad [id number] Syringe) 0.5 ml ONCE 

ONCE VAX IM ;  Start 3/17/17 at 09:00;  Stop 3/17/17 at 09:01;  Status DC


Pneumococcal Polyvalent Vaccine (Pneumovax 23) 0.5 ml ONCE ONCE VAX IM ;  Start 

3/17/17 at 09:00;  Stop 3/17/17 at 09:01;  Status DC


Albuterol Sulfate (Ventolin Neb Soln) 2.5 mg RTQID NEB  Last administered on 3/

20/17at 15:27;  Start 3/17/17 at 08:00


Furosemide (Lasix) 20 mg 1X  ONCE IVP  Last administered on 3/17/17at 12:34;  

Start 3/17/17 at 12:30;  Stop 3/17/17 at 12:31;  Status DC


Albuterol/ Ipratropium (Duoneb) 3 ml 1X  ONCE NEB  Last administered on 3/17/

17at 15:43;  Start 3/17/17 at 12:30;  Stop 3/17/17 at 12:31;  Status DC


Potassium Chloride (Klor-Con) 20 meq 1X  ONCE PO  Last administered on 3/17/

17at 12:35;  Start 3/17/17 at 12:00;  Stop 3/17/17 at 12:01;  Status DC


Amlodipine Besylate (Norvasc) 5 mg DAILY PO  Last administered on 3/17/17at 12:

34;  Start 3/17/17 at 12:00;  Stop 3/18/17 at 08:41;  Status DC


Furosemide (Lasix) 40 mg BID92 IVP  Last administered on 3/19/17at 14:02;  

Start 3/18/17 at 09:00;  Stop 3/19/17 at 18:42;  Status DC


Carvedilol (Coreg) 3.125 mg BIDWMEALS PO  Last administered on 3/19/17at 17:24;

  Start 3/18/17 at 09:00


Lisinopril (Prinivil) 2.5 mg DAILY PO  Last administered on 3/20/17at 09:51;  

Start 3/18/17 at 09:00


Potassium Chloride (Klor-Con) 40 meq 1X  ONCE PO  Last administered on 3/18/

17at 09:26;  Start 3/18/17 at 08:45;  Stop 3/18/17 at 08:46;  Status DC


Budesonide (Pulmicort) 0.5 mg RTBID NEB  Last administered on 3/20/17at 08:25;  

Start 3/18/17 at 20:00


Insulin Aspart (Novolog) 0-5 UNITS TIDWMEALS SQ ;  Start 3/18/17 at 17:00;  

Stop 3/19/17 at 10:34;  Status DC


Dextrose 12.5 gm PRN Q15MIN  PRN IV SEE COMMENTS;  Start 3/18/17 at 12:15;  

Stop 3/19/17 at 10:34;  Status DC


Guaifenesin (Mucinex) 600 mg BID PO  Last administered on 3/20/17at 09:51;  

Start 3/19/17 at 11:00


Cefpodoxime Proxetil (Vantin) 100 mg BID PO  Last administered on 3/20/17at 09:

51;  Start 3/19/17 at 11:00





Active Scripts


Active


Reported


No Known Medications Prior To Admisstion (Info)  Each 1 Each 


Vitals/I & O





 Vital Sign - Last 24 Hours








 3/19/17 3/19/17 3/19/17 3/19/17





 17:24 19:18 19:19 19:30


 


Temp    97.7





    97.7


 


Pulse 70   102


 


Resp    20


 


B/P 97/55   96/57


 


Pulse Ox    100


 


O2 Delivery  Nasal Cannula Nasal Cannula Nasal Cannula


 


O2 Flow Rate  2.0 2.0 2.0


 


    





    





 3/19/17 3/19/17 3/20/17 3/20/17





 20:00 23:00 03:45 07:24


 


Temp  97.7 97.8 98.0





  97.7 97.8 98.0


 


Pulse  68 74 79


 


Resp  18 18 20


 


B/P  112/68 105/61 105/73


 


Pulse Ox  98 99 98


 


O2 Delivery Nasal Cannula Nasal Cannula Nasal Cannula Nasal Cannula


 


O2 Flow Rate 2.0 2.0 2.0 2.0


 


    





    





 3/20/17 3/20/17 3/20/17 3/20/17





 08:00 08:25 09:51 10:07


 


Temp    98.2





    98.2


 


Pulse   80 82


 


Resp    20


 


B/P   105/73 104/78


 


Pulse Ox  99  97


 


O2 Delivery Nasal Cannula Nasal Cannula  Nasal Cannula


 


O2 Flow Rate 2.0 2.0  2.0


 


    





    





 3/20/17 3/20/17 3/20/17 





 11:24 14:17 15:29 


 


Pulse  78  


 


Resp  20  


 


B/P  102/68  


 


Pulse Ox 99 97  


 


O2 Delivery Nasal Cannula Nasal Cannula Nasal Cannula 


 


O2 Flow Rate 2.0 2.0 2.0 














 Intake and Output   


 


 3/19/17 3/19/17 3/20/17





 15:00 23:00 07:00


 


Intake Total   240 ml


 


Balance   240 ml














DAVID ARCEO MD Mar 20, 2017 16:39

## 2017-03-20 NOTE — PDOC
PROGRESS NOTES


Subjective


Subjective


Patient without complaint, denies CP or SOA.





Objective


Objective





 Vital Signs








  Date Time  Temp Pulse Resp B/P Pulse Ox O2 Delivery O2 Flow Rate FiO2


 


3/20/17 08:25     99 Nasal Cannula 2.0 


 


3/20/17 07:24 98.0 79 20 105/73    





 98.0       














 Intake and Output 


 


 3/20/17





 07:00


 


Intake Total 240 ml


 


Balance 240 ml


 


 


 


Intake Oral 240 ml


 


# Voids 3











Physical Exam


Abdomen:  Normal bowel sounds, Soft, No tenderness


Heart:  Regular rate, Other (II/VI holosystolic murmur)


Extremities:  Other (1+ pitting edema bilateral LE's, ROSAS hose on)


General:  Alert, Oriented X3, No acute distress


Lungs:  Other (BS decreased throughout but otherwise CTA, no crackles or wheezes

)





Assessment


Assessment


 Problems


Medical Problems:


(1) CHF (congestive heart failure)


Status: Acute  





(2) Elevated glucose


Status: Acute  





(3) Pedal edema


Status: Acute  





(4) Pulmonary edema


Status: Acute  











Plan


Plan of Care


1. AE CHF with acute respiratory failure - much improved over the weekend. 

Repeat CXR shows decreased vascular congestion (but increased pleural effusions?

). Lasix on hold today due to decreased BP's, which are also better. Continue 

low dose ACE and Coreg per Cardiology. Consider resuming po Lasix tomorrow.


2. history of glucose intolerance - euglycemic now, continue ADA diet.


3. chronic venous insufficieny - patient has history of this. Also appears 

improved from admission, continue ROSAS hose.


4. debility - unclear if patient is strong enough to return home with her 

daughter at present. PT, OT and SW consulted to help with discharge planning - 

possible transfer to skilled nursing or discharge home tomorrow?





Comment


Review of Relevant


I have reviewed the following items wally (where applicable) has been applied.


Labs





Laboratory Tests








Test


  3/18/17


16:46 3/18/17


20:59 3/19/17


08:03


 


Glucose (Fingerstick)


  71mg/dL


(70-99) 79mg/dL


(70-99) 106mg/dL


(70-99)








Medications





 Current Medications


Aspirin (Children'S Aspirin) 324 mg 1X  ONCE PO  Last administered on 3/17/17at 

00:50;  Start 3/17/17 at 00:30;  Stop 3/17/17 at 00:31;  Status DC


Furosemide (Lasix) 40 mg 1X  ONCE IVP  Last administered on 3/17/17at 00:49;  

Start 3/17/17 at 00:30;  Stop 3/17/17 at 00:31;  Status DC


Ondansetron HCl (Zofran) 4 mg PRN Q8HRS  PRN IV NAUSEA/VOMITING;  Start 3/17/17 

at 00:15;  Stop 3/18/17 at 00:14;  Status DC


Morphine Sulfate 2 mg PRN Q2HR  PRN IV SEVERE PAIN;  Start 3/17/17 at 00:15;  

Stop 3/18/17 at 00:14;  Status DC


Potassium Chloride (Klor-Con) 40 meq 1X  ONCE PO  Last administered on 3/17/

17at 00:50;  Start 3/17/17 at 00:30;  Stop 3/17/17 at 00:31;  Status DC


Albuterol/ Ipratropium (Duoneb) 3 ml 1X  ONCE NEB  Last administered on 3/17/

17at 01:20;  Start 3/17/17 at 01:30;  Stop 3/17/17 at 01:31;  Status DC


Methylprednisolone Sodium Succinate (Solu-Medrol 125mg Vial) 125 mg 1X  ONCE IV

  Last administered on 3/17/17at 01:50;  Start 3/17/17 at 01:30;  Stop 3/17/17 

at 01:31;  Status DC


Info (Do NOT chart on this placeholder) 1 each PRN 1X  PRN MC SEE COMMENTS;  

Start 3/17/17 at 06:15;  Status UNV


Pneumococcal Polyvalent Vaccine (Do NOT chart on this placeholder) 1 each PRN 

1X  PRN MC SEE COMMENTS;  Start 3/17/17 at 06:15;  Status UNV


Influenza Virus Vaccine Quadrival (Fluarix Quad [id number] Syringe) 0.5 ml ONCE 

ONCE VAX IM ;  Start 3/17/17 at 09:00;  Stop 3/17/17 at 09:01;  Status DC


Pneumococcal Polyvalent Vaccine (Pneumovax 23) 0.5 ml ONCE ONCE VAX IM ;  Start 

3/17/17 at 09:00;  Stop 3/17/17 at 09:01;  Status DC


Albuterol Sulfate (Ventolin Neb Soln) 2.5 mg RTQID NEB  Last administered on 3/

20/17at 08:24;  Start 3/17/17 at 08:00


Furosemide (Lasix) 20 mg 1X  ONCE IVP  Last administered on 3/17/17at 12:34;  

Start 3/17/17 at 12:30;  Stop 3/17/17 at 12:31;  Status DC


Albuterol/ Ipratropium (Duoneb) 3 ml 1X  ONCE NEB  Last administered on 3/17/

17at 15:43;  Start 3/17/17 at 12:30;  Stop 3/17/17 at 12:31;  Status DC


Potassium Chloride (Klor-Con) 20 meq 1X  ONCE PO  Last administered on 3/17/

17at 12:35;  Start 3/17/17 at 12:00;  Stop 3/17/17 at 12:01;  Status DC


Amlodipine Besylate (Norvasc) 5 mg DAILY PO  Last administered on 3/17/17at 12:

34;  Start 3/17/17 at 12:00;  Stop 3/18/17 at 08:41;  Status DC


Furosemide (Lasix) 40 mg BID92 IVP  Last administered on 3/19/17at 14:02;  

Start 3/18/17 at 09:00;  Stop 3/19/17 at 18:42;  Status DC


Carvedilol (Coreg) 3.125 mg BIDWMEALS PO  Last administered on 3/19/17at 17:24;

  Start 3/18/17 at 09:00


Lisinopril (Prinivil) 2.5 mg DAILY PO  Last administered on 3/19/17at 08:01;  

Start 3/18/17 at 09:00


Potassium Chloride (Klor-Con) 40 meq 1X  ONCE PO  Last administered on 3/18/

17at 09:26;  Start 3/18/17 at 08:45;  Stop 3/18/17 at 08:46;  Status DC


Budesonide (Pulmicort) 0.5 mg RTBID NEB  Last administered on 3/20/17at 08:25;  

Start 3/18/17 at 20:00


Insulin Aspart (Novolog) 0-5 UNITS TIDWMEALS SQ ;  Start 3/18/17 at 17:00;  

Stop 3/19/17 at 10:34;  Status DC


Dextrose 12.5 gm PRN Q15MIN  PRN IV SEE COMMENTS;  Start 3/18/17 at 12:15;  

Stop 3/19/17 at 10:34;  Status DC


Guaifenesin (Mucinex) 600 mg BID PO  Last administered on 3/19/17at 20:37;  

Start 3/19/17 at 11:00


Cefpodoxime Proxetil (Vantin) 100 mg BID PO  Last administered on 3/19/17at 20:

37;  Start 3/19/17 at 11:00





Active Scripts


Active


Reported


No Known Medications Prior To Admisstion (Info)  Each 1 Each 


Vitals/I & O





 Vital Sign - Last 24 Hours








 3/19/17 3/19/17 3/19/17 3/19/17





 11:09 11:31 14:32 15:56


 


Temp 98.0  98.2 





 98.0  98.2 


 


Pulse 75  76 


 


Resp   20 


 


B/P 91/50  101/64 


 


Pulse Ox 97  97 


 


O2 Delivery Nasal Cannula Nasal Cannula Nasal Cannula Nasal Cannula


 


O2 Flow Rate 2.0 2.0 2.0 2.0


 


    





    





 3/19/17 3/19/17 3/19/17 3/19/17





 17:24 19:18 19:19 19:30


 


Temp    97.7





    97.7


 


Pulse 70   102


 


Resp    20


 


B/P 97/55   96/57


 


Pulse Ox    100


 


O2 Delivery  Nasal Cannula Nasal Cannula Nasal Cannula


 


O2 Flow Rate  2.0 2.0 2.0


 


    





    





 3/19/17 3/19/17 3/20/17 3/20/17





 20:00 23:00 03:45 07:24


 


Temp  97.7 97.8 98.0





  97.7 97.8 98.0


 


Pulse  68 74 79


 


Resp  18 18 20


 


B/P  112/68 105/61 105/73


 


Pulse Ox  98 99 98


 


O2 Delivery Nasal Cannula Nasal Cannula Nasal Cannula Nasal Cannula


 


O2 Flow Rate 2.0 2.0 2.0 2.0


 


    





    





 3/20/17   





 08:25   


 


Pulse Ox 99   


 


O2 Delivery Nasal Cannula   


 


O2 Flow Rate 2.0   














 Intake and Output   


 


 3/19/17 3/19/17 3/20/17





 15:00 23:00 07:00


 


Intake Total   240 ml


 


Balance   240 ml














SANDRA VYAS MD Mar 20, 2017 08:47

## 2017-03-21 VITALS — DIASTOLIC BLOOD PRESSURE: 79 MMHG | SYSTOLIC BLOOD PRESSURE: 122 MMHG

## 2017-03-21 VITALS — SYSTOLIC BLOOD PRESSURE: 134 MMHG | DIASTOLIC BLOOD PRESSURE: 87 MMHG

## 2017-03-21 VITALS — SYSTOLIC BLOOD PRESSURE: 121 MMHG | DIASTOLIC BLOOD PRESSURE: 73 MMHG

## 2017-03-21 VITALS — SYSTOLIC BLOOD PRESSURE: 127 MMHG | DIASTOLIC BLOOD PRESSURE: 64 MMHG

## 2017-03-21 VITALS — DIASTOLIC BLOOD PRESSURE: 69 MMHG | SYSTOLIC BLOOD PRESSURE: 107 MMHG

## 2017-03-21 VITALS — SYSTOLIC BLOOD PRESSURE: 116 MMHG | DIASTOLIC BLOOD PRESSURE: 74 MMHG

## 2017-03-21 LAB
ANION GAP SERPL CALC-SCNC: 5 MMOL/L (ref 6–14)
BUN SERPL-MCNC: 23 MG/DL (ref 7–20)
CALCIUM SERPL-MCNC: 8.7 MG/DL (ref 8.5–10.1)
CHLORIDE SERPL-SCNC: 106 MMOL/L (ref 98–107)
CHOLEST SERPL-MCNC: 117 MG/DL (ref 0–200)
CHOLEST/HDLC SERPL: 2.2 {RATIO}
CO2 SERPL-SCNC: 36 MMOL/L (ref 21–32)
CREAT SERPL-MCNC: 0.8 MG/DL (ref 0.6–1)
GFR SERPLBLD BASED ON 1.73 SQ M-ARVRAT: 81.9 ML/MIN
GLUCOSE SERPL-MCNC: 100 MG/DL (ref 70–99)
HDLC SERPL-MCNC: 53 MG/DL (ref 40–60)
POTASSIUM SERPL-SCNC: 3.5 MMOL/L (ref 3.5–5.1)
SODIUM SERPL-SCNC: 147 MMOL/L (ref 136–145)
TRIGL SERPL-MCNC: 44 MG/DL (ref 0–150)

## 2017-03-21 RX ADMIN — CEFPODOXIME PROXETIL SCH MG: 100 TABLET, FILM COATED ORAL at 21:10

## 2017-03-21 RX ADMIN — CARVEDILOL SCH MG: 3.12 TABLET, FILM COATED ORAL at 08:21

## 2017-03-21 RX ADMIN — GUAIFENESIN SCH MG: 600 TABLET, EXTENDED RELEASE ORAL at 21:10

## 2017-03-21 RX ADMIN — LISINOPRIL SCH MG: 2.5 TABLET ORAL at 08:20

## 2017-03-21 RX ADMIN — GUAIFENESIN SCH MG: 600 TABLET, EXTENDED RELEASE ORAL at 08:21

## 2017-03-21 RX ADMIN — ALBUTEROL SULFATE SCH MG: 108 AEROSOL, METERED RESPIRATORY (INHALATION) at 08:13

## 2017-03-21 RX ADMIN — ALBUTEROL SULFATE SCH MG: 108 AEROSOL, METERED RESPIRATORY (INHALATION) at 11:25

## 2017-03-21 RX ADMIN — CEFPODOXIME PROXETIL SCH MG: 100 TABLET, FILM COATED ORAL at 08:20

## 2017-03-21 RX ADMIN — ALBUTEROL SULFATE SCH MG: 108 AEROSOL, METERED RESPIRATORY (INHALATION) at 19:26

## 2017-03-21 RX ADMIN — BUDESONIDE SCH MG: 0.5 SUSPENSION RESPIRATORY (INHALATION) at 08:13

## 2017-03-21 RX ADMIN — ALBUTEROL SULFATE SCH MG: 108 AEROSOL, METERED RESPIRATORY (INHALATION) at 16:00

## 2017-03-21 RX ADMIN — CARVEDILOL SCH MG: 3.12 TABLET, FILM COATED ORAL at 17:00

## 2017-03-21 RX ADMIN — ASPIRIN SCH MG: 81 TABLET, COATED ORAL at 13:10

## 2017-03-21 RX ADMIN — POTASSIUM CHLORIDE SCH MEQ: 750 TABLET, FILM COATED, EXTENDED RELEASE ORAL at 08:20

## 2017-03-21 RX ADMIN — Medication SCH MG: at 08:20

## 2017-03-21 RX ADMIN — BUDESONIDE SCH MG: 0.5 SUSPENSION RESPIRATORY (INHALATION) at 19:27

## 2017-03-21 NOTE — PDOC
FARAZ DISLA APRN 3/21/17 1037:


CARDIO Progress Notes


Date and Time


Date of Service


3/21/2017


Time of Evaluation


1030





Subjective


Subjective:  No Chest Pain, No shortness of breath, No Palpitations, No 

Dizziness





Vitals


Vitals





 Vital Signs








  Date Time  Temp Pulse Resp B/P Pulse Ox O2 Delivery O2 Flow Rate FiO2


 


3/21/17 10:09 98.7 81 18 121/73 96 Room Air  





 98.7       


 


3/20/17 20:00       2.0 








Weight


Weight [ ]





Input and Output


Intake and Output











 Intake and Output 


 


 3/21/17





 07:00


 


Intake Total 1050 ml


 


Output Total 100 ml


 


Balance 950 ml


 


 


 


Intake Oral 1050 ml


 


Output Urine Total 100 ml


 


# Voids 8











Laboratory


Labs





Laboratory Tests








Test


  3/21/17


03:18


 


Sodium Level


  147mmol/L


(136-145)


 


Potassium Level


  3.5mmol/L


(3.5-5.1)


 


Chloride Level


  106mmol/L


()


 


Carbon Dioxide Level


  36mmol/L


(21-32)


 


Anion Gap 5 (6-14) 


 


Blood Urea Nitrogen 23mg/dL (7-20) 


 


Creatinine


  0.8mg/dL


(0.6-1.0)


 


Estimated GFR


(Cockcroft-Gault) 81.9 


 


 


Glucose Level


  100mg/dL


(70-99)


 


Calcium Level


  8.7mg/dL


(8.5-10.1)


 


Magnesium Level


  1.9mg/dL


(1.8-2.4)











Physical Exam


HEENT:  Neck Supple W Full Motion


Chest:  Symmetric


LUNGS:  Other (diminished bases)


Heart:  S1S2, RRR (SR with PACs. )


Abdomen:  Soft N/T


Extremities:  Other (2+ bilateral LE edema with chronic venous stasis insuff)


Neurology:  alert, oriented, follow commands, other (periods of confusion)





Assessment


Assessment


1.  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 

compensated. 


2.  Cardiomyopathy: NICM vs ICM.  EF 35%


3.  Moderate-severe aortic stenosis


4.  NSTEMI: CP free. hemodynamically stable. No significant rhythm ectopies


5.  HTN: controlled


6.  Chronic venous insufficiency


7.  Dementia





Plan


1. Continue with diuretic therapy


2. Postponed LHC initially due to inability to lay supine. Much better today. 

Will discuss with primary cardiologist regarding timeline of LHC


3. Continue with optimization and secondary prevention


4. ASA. lipid panel.





DAVID ARCEO MD 3/21/17 1549:


CARDIO Progress Notes


Assessment


Assessment


Patient seen and examined.


The patient appears improved today with less shortness of breath and mildly 

more responsive. However she again reports she cannot lay flat. We again 

discussed possible heart catheterization and she states that she would like to 

proceed when she can comfortably lay flat. At the present time we'll continue 

medical treatment. We will keep nothing by mouth after midnight. We'll 

reevaluate in the morning. If the patient is able to proceed with a 

catheterization tomorrow we will perform the test. If she remains too short of 

breath for the procedure we will then continue on medical treatment and 

reevaluate the patient in the office in 2 weeks to determine the 

appropriateness and ability to perform further testing at that time.








FARAZ DISLA Mar 21, 2017 10:37


DAVID ARCEO MD Mar 21, 2017 15:49

## 2017-03-21 NOTE — PDOC
PROGRESS NOTES


Subjective


Subjective


Patient without complaint, does not remember needing O2 during her walk with PT 

yesterday.





Objective


Objective





 Vital Signs








  Date Time  Temp Pulse Resp B/P Pulse Ox O2 Delivery O2 Flow Rate FiO2


 


3/21/17 03:30 98.0 85 20 116/74 93 Room Air  





 98.0       


 


3/20/17 20:00       2.0 














 Intake and Output 


 


 3/21/17





 07:00


 


Intake Total 1050 ml


 


Output Total 100 ml


 


Balance 950 ml


 


 


 


Intake Oral 1050 ml


 


Output Urine Total 100 ml


 


# Voids 8











Physical Exam


Abdomen:  Normal bowel sounds, Soft, No tenderness


Heart:  Regular rate (frequent prematurities, II/VI systolic murmur)


Extremities:  Other (1+ edema bilateral LE's, ROSAS hose on)


General:  Alert, No acute distress


Lungs:  Other (BS somewhat decreased throughout, no wheezes or crackles)





Assessment


Assessment


 Problems


Medical Problems:


(1) CHF (congestive heart failure)


Status: Acute  





(2) Elevated glucose


Status: Acute  





(3) Pedal edema


Status: Acute  





(4) Pulmonary edema


Status: Acute  











Plan


Plan of Care


1. AE CHF with acute respiratory failure - stable. I's and O's inaccurate. Will 

resume po Lasix today and follow. Catheterization per Cardiology.


2. chronic venous insufficiency - LE edema much improved since admission, 

continue ROSAS hose.


3. glucose intolerance - A1C was at upper limit of normal range, glucose on 

labs only elevated mildly if at all, continue ADA diet.


4. debility - appears that patient would benefit from some time at skilled 

nursing when ready for discharge. SW consulted, no note from them yet. Continue 

therapies.





Comment


Review of Relevant


I have reviewed the following items wally (where applicable) has been applied.


Labs





Laboratory Tests








Test


  3/21/17


03:18


 


Sodium Level


  147mmol/L


(136-145)


 


Potassium Level


  3.5mmol/L


(3.5-5.1)


 


Chloride Level


  106mmol/L


()


 


Carbon Dioxide Level


  36mmol/L


(21-32)


 


Anion Gap 5 (6-14) 


 


Blood Urea Nitrogen 23mg/dL (7-20) 


 


Creatinine


  0.8mg/dL


(0.6-1.0)


 


Estimated GFR


(Cockcroft-Gault) 81.9 


 


 


Glucose Level


  100mg/dL


(70-99)


 


Calcium Level


  8.7mg/dL


(8.5-10.1)


 


Magnesium Level


  1.9mg/dL


(1.8-2.4)








Laboratory Tests








Test


  3/21/17


03:18


 


Sodium Level


  147mmol/L


(136-145)


 


Potassium Level


  3.5mmol/L


(3.5-5.1)


 


Chloride Level


  106mmol/L


()


 


Carbon Dioxide Level


  36mmol/L


(21-32)


 


Anion Gap 5 (6-14) 


 


Blood Urea Nitrogen 23mg/dL (7-20) 


 


Creatinine


  0.8mg/dL


(0.6-1.0)


 


Estimated GFR


(Cockcroft-Gault) 81.9 


 


 


Glucose Level


  100mg/dL


(70-99)


 


Calcium Level


  8.7mg/dL


(8.5-10.1)


 


Magnesium Level


  1.9mg/dL


(1.8-2.4)








Medications





 Current Medications


Aspirin (Children'S Aspirin) 324 mg 1X  ONCE PO  Last administered on 3/17/17at 

00:50;  Start 3/17/17 at 00:30;  Stop 3/17/17 at 00:31;  Status DC


Furosemide (Lasix) 40 mg 1X  ONCE IVP  Last administered on 3/17/17at 00:49;  

Start 3/17/17 at 00:30;  Stop 3/17/17 at 00:31;  Status DC


Ondansetron HCl (Zofran) 4 mg PRN Q8HRS  PRN IV NAUSEA/VOMITING;  Start 3/17/17 

at 00:15;  Stop 3/18/17 at 00:14;  Status DC


Morphine Sulfate 2 mg PRN Q2HR  PRN IV SEVERE PAIN;  Start 3/17/17 at 00:15;  

Stop 3/18/17 at 00:14;  Status DC


Potassium Chloride (Klor-Con) 40 meq 1X  ONCE PO  Last administered on 3/17/

17at 00:50;  Start 3/17/17 at 00:30;  Stop 3/17/17 at 00:31;  Status DC


Albuterol/ Ipratropium (Duoneb) 3 ml 1X  ONCE NEB  Last administered on 3/17/

17at 01:20;  Start 3/17/17 at 01:30;  Stop 3/17/17 at 01:31;  Status DC


Methylprednisolone Sodium Succinate (Solu-Medrol 125mg Vial) 125 mg 1X  ONCE IV

  Last administered on 3/17/17at 01:50;  Start 3/17/17 at 01:30;  Stop 3/17/17 

at 01:31;  Status DC


Info (Do NOT chart on this placeholder) 1 each PRN 1X  PRN MC SEE COMMENTS;  

Start 3/17/17 at 06:15;  Status UNV


Pneumococcal Polyvalent Vaccine (Do NOT chart on this placeholder) 1 each PRN 

1X  PRN MC SEE COMMENTS;  Start 3/17/17 at 06:15;  Status UNV


Influenza Virus Vaccine Quadrival (Fluarix Quad 8727-6045 Syringe) 0.5 ml ONCE 

ONCE VAX IM  Last administered on 3/20/17at 17:55;  Start 3/17/17 at 09:00;  

Stop 3/17/17 at 09:01;  Status DC


Pneumococcal Polyvalent Vaccine (Pneumovax 23) 0.5 ml ONCE ONCE VAX IM  Last 

administered on 3/20/17at 17:54;  Start 3/17/17 at 09:00;  Stop 3/17/17 at 09:01

;  Status DC


Albuterol Sulfate (Ventolin Neb Soln) 2.5 mg RTQID NEB  Last administered on 3/

20/17at 19:32;  Start 3/17/17 at 08:00


Furosemide (Lasix) 20 mg 1X  ONCE IVP  Last administered on 3/17/17at 12:34;  

Start 3/17/17 at 12:30;  Stop 3/17/17 at 12:31;  Status DC


Albuterol/ Ipratropium (Duoneb) 3 ml 1X  ONCE NEB  Last administered on 3/17/

17at 15:43;  Start 3/17/17 at 12:30;  Stop 3/17/17 at 12:31;  Status DC


Potassium Chloride (Klor-Con) 20 meq 1X  ONCE PO  Last administered on 3/17/

17at 12:35;  Start 3/17/17 at 12:00;  Stop 3/17/17 at 12:01;  Status DC


Amlodipine Besylate (Norvasc) 5 mg DAILY PO  Last administered on 3/17/17at 12:

34;  Start 3/17/17 at 12:00;  Stop 3/18/17 at 08:41;  Status DC


Furosemide (Lasix) 40 mg BID92 IVP  Last administered on 3/19/17at 14:02;  

Start 3/18/17 at 09:00;  Stop 3/19/17 at 18:42;  Status DC


Carvedilol (Coreg) 3.125 mg BIDWMEALS PO  Last administered on 3/19/17at 17:24;

  Start 3/18/17 at 09:00


Lisinopril (Prinivil) 2.5 mg DAILY PO  Last administered on 3/20/17at 09:51;  

Start 3/18/17 at 09:00


Potassium Chloride (Klor-Con) 40 meq 1X  ONCE PO  Last administered on 3/18/

17at 09:26;  Start 3/18/17 at 08:45;  Stop 3/18/17 at 08:46;  Status DC


Budesonide (Pulmicort) 0.5 mg RTBID NEB  Last administered on 3/20/17at 19:32;  

Start 3/18/17 at 20:00


Insulin Aspart (Novolog) 0-5 UNITS TIDWMEALS SQ ;  Start 3/18/17 at 17:00;  

Stop 3/19/17 at 10:34;  Status DC


Dextrose 12.5 gm PRN Q15MIN  PRN IV SEE COMMENTS;  Start 3/18/17 at 12:15;  

Stop 3/19/17 at 10:34;  Status DC


Guaifenesin (Mucinex) 600 mg BID PO  Last administered on 3/20/17at 20:37;  

Start 3/19/17 at 11:00


Cefpodoxime Proxetil (Vantin) 100 mg BID PO  Last administered on 3/20/17at 20:

37;  Start 3/19/17 at 11:00





Active Scripts


Active


Reported


No Known Medications Prior To Admisstion (Info)  Each 1 Each 


Vitals/I & O





 Vital Sign - Last 24 Hours








 3/20/17 3/20/17 3/20/17 3/20/17





 08:25 09:51 10:07 11:24


 


Temp   98.2 





   98.2 


 


Pulse  80 82 


 


Resp   20 


 


B/P  105/73 104/78 


 


Pulse Ox 99  97 99


 


O2 Delivery Nasal Cannula  Nasal Cannula Nasal Cannula


 


O2 Flow Rate 2.0  2.0 2.0


 


    





    





 3/20/17 3/20/17 3/20/17 3/20/17





 14:17 15:29 19:25 19:32


 


Temp   98.5 





   98.5 


 


Pulse 78  78 


 


Resp 20  20 


 


B/P 102/68  89/56 


 


Pulse Ox 97  99 100


 


O2 Delivery Nasal Cannula Nasal Cannula Nasal Cannula Nasal Cannula


 


O2 Flow Rate 2.0 2.0 2.0 2.0


 


    





    





 3/20/17 3/20/17 3/21/17 





 20:00 23:10 03:30 


 


Temp  98.4 98.0 





  98.4 98.0 


 


Pulse  79 85 


 


Resp  20 20 


 


B/P  133/61 116/74 


 


Pulse Ox  94 93 


 


O2 Delivery Nasal Cannula Room Air Room Air 


 


O2 Flow Rate 2.0   














 Intake and Output   


 


 3/20/17 3/20/17 3/21/17





 15:00 23:00 07:00


 


Intake Total 150 ml 800 ml 100 ml


 


Output Total   100 ml


 


Balance 150 ml 800 ml 0 ml














SANDRA VYAS MD Mar 21, 2017 08:14

## 2017-03-22 VITALS — DIASTOLIC BLOOD PRESSURE: 77 MMHG | SYSTOLIC BLOOD PRESSURE: 180 MMHG

## 2017-03-22 VITALS — SYSTOLIC BLOOD PRESSURE: 163 MMHG | DIASTOLIC BLOOD PRESSURE: 91 MMHG

## 2017-03-22 VITALS — SYSTOLIC BLOOD PRESSURE: 157 MMHG | DIASTOLIC BLOOD PRESSURE: 72 MMHG

## 2017-03-22 VITALS — SYSTOLIC BLOOD PRESSURE: 129 MMHG | DIASTOLIC BLOOD PRESSURE: 69 MMHG

## 2017-03-22 VITALS — SYSTOLIC BLOOD PRESSURE: 110 MMHG | DIASTOLIC BLOOD PRESSURE: 66 MMHG

## 2017-03-22 VITALS — SYSTOLIC BLOOD PRESSURE: 145 MMHG | DIASTOLIC BLOOD PRESSURE: 101 MMHG

## 2017-03-22 VITALS — DIASTOLIC BLOOD PRESSURE: 85 MMHG | SYSTOLIC BLOOD PRESSURE: 131 MMHG

## 2017-03-22 VITALS — SYSTOLIC BLOOD PRESSURE: 138 MMHG | DIASTOLIC BLOOD PRESSURE: 79 MMHG

## 2017-03-22 VITALS — DIASTOLIC BLOOD PRESSURE: 77 MMHG | SYSTOLIC BLOOD PRESSURE: 135 MMHG

## 2017-03-22 VITALS — DIASTOLIC BLOOD PRESSURE: 62 MMHG | SYSTOLIC BLOOD PRESSURE: 135 MMHG

## 2017-03-22 VITALS — SYSTOLIC BLOOD PRESSURE: 132 MMHG | DIASTOLIC BLOOD PRESSURE: 80 MMHG

## 2017-03-22 VITALS — SYSTOLIC BLOOD PRESSURE: 154 MMHG | DIASTOLIC BLOOD PRESSURE: 87 MMHG

## 2017-03-22 VITALS — SYSTOLIC BLOOD PRESSURE: 153 MMHG | DIASTOLIC BLOOD PRESSURE: 95 MMHG

## 2017-03-22 VITALS — DIASTOLIC BLOOD PRESSURE: 64 MMHG | SYSTOLIC BLOOD PRESSURE: 115 MMHG

## 2017-03-22 VITALS — SYSTOLIC BLOOD PRESSURE: 133 MMHG | DIASTOLIC BLOOD PRESSURE: 79 MMHG

## 2017-03-22 VITALS — DIASTOLIC BLOOD PRESSURE: 64 MMHG | SYSTOLIC BLOOD PRESSURE: 142 MMHG

## 2017-03-22 VITALS — SYSTOLIC BLOOD PRESSURE: 127 MMHG | DIASTOLIC BLOOD PRESSURE: 85 MMHG

## 2017-03-22 VITALS — DIASTOLIC BLOOD PRESSURE: 85 MMHG | SYSTOLIC BLOOD PRESSURE: 138 MMHG

## 2017-03-22 VITALS — SYSTOLIC BLOOD PRESSURE: 125 MMHG | DIASTOLIC BLOOD PRESSURE: 68 MMHG

## 2017-03-22 PROCEDURE — 02703DZ DILATION OF CORONARY ARTERY, ONE ARTERY WITH INTRALUMINAL DEVICE, PERCUTANEOUS APPROACH: ICD-10-PCS | Performed by: INTERNAL MEDICINE

## 2017-03-22 PROCEDURE — 4A023N7 MEASUREMENT OF CARDIAC SAMPLING AND PRESSURE, LEFT HEART, PERCUTANEOUS APPROACH: ICD-10-PCS | Performed by: INTERNAL MEDICINE

## 2017-03-22 PROCEDURE — B2111ZZ FLUOROSCOPY OF MULTIPLE CORONARY ARTERIES USING LOW OSMOLAR CONTRAST: ICD-10-PCS | Performed by: INTERNAL MEDICINE

## 2017-03-22 RX ADMIN — CEFPODOXIME PROXETIL SCH MG: 100 TABLET, FILM COATED ORAL at 08:02

## 2017-03-22 RX ADMIN — ASPIRIN SCH MG: 81 TABLET, COATED ORAL at 08:02

## 2017-03-22 RX ADMIN — LISINOPRIL SCH MG: 2.5 TABLET ORAL at 13:07

## 2017-03-22 RX ADMIN — BUDESONIDE SCH MG: 0.5 SUSPENSION RESPIRATORY (INHALATION) at 07:11

## 2017-03-22 RX ADMIN — IPRATROPIUM BROMIDE AND ALBUTEROL SULFATE SCH ML: .5; 3 SOLUTION RESPIRATORY (INHALATION) at 19:21

## 2017-03-22 RX ADMIN — POTASSIUM CHLORIDE SCH MEQ: 750 TABLET, FILM COATED, EXTENDED RELEASE ORAL at 08:02

## 2017-03-22 RX ADMIN — GUAIFENESIN SCH MG: 600 TABLET, EXTENDED RELEASE ORAL at 08:01

## 2017-03-22 RX ADMIN — IPRATROPIUM BROMIDE AND ALBUTEROL SULFATE SCH ML: .5; 3 SOLUTION RESPIRATORY (INHALATION) at 16:39

## 2017-03-22 RX ADMIN — Medication SCH MG: at 13:06

## 2017-03-22 RX ADMIN — CARVEDILOL SCH MG: 3.12 TABLET, FILM COATED ORAL at 18:28

## 2017-03-22 RX ADMIN — CARVEDILOL SCH MG: 3.12 TABLET, FILM COATED ORAL at 08:05

## 2017-03-22 RX ADMIN — ALBUTEROL SULFATE SCH MG: 108 AEROSOL, METERED RESPIRATORY (INHALATION) at 07:11

## 2017-03-22 NOTE — PDOC
PROGRESS NOTES


Subjective


Subjective


Patient without complaint, denies pain or SOA.





Objective


Objective





 Vital Signs








  Date Time  Temp Pulse Resp B/P Pulse Ox O2 Delivery O2 Flow Rate FiO2


 


3/22/17 08:05  95  109/66    


 


3/22/17 07:29 97.8  18  96 Room Air  





 97.8       


 


3/20/17 20:00       2.0 














 Intake and Output 


 


 3/22/17





 07:00


 


Intake Total 240 ml


 


Output Total 625 ml


 


Balance -385 ml


 


 


 


Intake Oral 240 ml


 


Output Urine Total 625 ml


 


# Voids 5


 


# Bowel Movements 1











Physical Exam


Abdomen:  Normal bowel sounds, Soft, No tenderness


Heart:  Regular rate, Other (II/VI systolic murmur)


Extremities:  No edema (skin with chronic venous stasis changes)


General:  Alert, No acute distress


Lungs:  Other (BS mildly decreased throughout, no wheezes or crackles heard)





Assessment


Assessment


 Problems


Medical Problems:


(1) CHF (congestive heart failure)


Status: Acute  





(2) Elevated glucose


Status: Acute  





(3) Pedal edema


Status: Acute  





(4) Pulmonary edema


Status: Acute  











Plan


Plan of Care


1. NSTEMI with AE systolic CHF and acute respiratory failure - stable, appears 

compensated on present Lasix. No hypoxia at rest on room air. Tolerating low 

doses of ACE and Coreg. Will discharge to [facility] today unless Cardiology proceeds 

with cath today.


2. AS - moderate on Echo.


3. glucose intolerance - mild, continue ADA diet, no meds needed.


4. chronic venous insufficiency - much improved with diuresis and ROSAS hose. 

Continue compression hose when out of bed.


5. dementia - patient mildly forgetful but calm and cooperative, continue 

supportive care.


6. debility - SW has spoken with patient's daughter, who is in agreement with 

transferring her to [facility] today.





Comment


Review of Relevant


I have reviewed the following items wally (where applicable) has been applied.


Labs





Laboratory Tests








Test


  3/21/17


03:18


 


Sodium Level


  147mmol/L


(136-145)


 


Potassium Level


  3.5mmol/L


(3.5-5.1)


 


Chloride Level


  106mmol/L


()


 


Carbon Dioxide Level


  36mmol/L


(21-32)


 


Anion Gap 5 (6-14) 


 


Blood Urea Nitrogen 23mg/dL (7-20) 


 


Creatinine


  0.8mg/dL


(0.6-1.0)


 


Estimated GFR


(Cockcroft-Gault) 81.9 


 


 


Glucose Level


  100mg/dL


(70-99)


 


Calcium Level


  8.7mg/dL


(8.5-10.1)


 


Magnesium Level


  1.9mg/dL


(1.8-2.4)


 


Triglycerides Level


  44mg/dL


(0-150)


 


Cholesterol Level


  117mg/dL


(0-200)


 


LDL Cholesterol, Calculated


  55mg/dL


(0-100)


 


VLDL Cholesterol, Calculated 9mg/dL (0-40) 


 


HDL Cholesterol


  53mg/dL


(40-60)


 


Cholesterol/HDL Ratio 2.2 








Medications





 Current Medications


Aspirin (Children'S Aspirin) 324 mg 1X  ONCE PO  Last administered on 3/17/17at 

00:50;  Start 3/17/17 at 00:30;  Stop 3/17/17 at 00:31;  Status DC


Furosemide (Lasix) 40 mg 1X  ONCE IVP  Last administered on 3/17/17at 00:49;  

Start 3/17/17 at 00:30;  Stop 3/17/17 at 00:31;  Status DC


Ondansetron HCl (Zofran) 4 mg PRN Q8HRS  PRN IV NAUSEA/VOMITING;  Start 3/17/17 

at 00:15;  Stop 3/18/17 at 00:14;  Status DC


Morphine Sulfate 2 mg PRN Q2HR  PRN IV SEVERE PAIN;  Start 3/17/17 at 00:15;  

Stop 3/18/17 at 00:14;  Status DC


Potassium Chloride (Klor-Con) 40 meq 1X  ONCE PO  Last administered on 3/17/

17at 00:50;  Start 3/17/17 at 00:30;  Stop 3/17/17 at 00:31;  Status DC


Albuterol/ Ipratropium (Duoneb) 3 ml 1X  ONCE NEB  Last administered on 3/17/

17at 01:20;  Start 3/17/17 at 01:30;  Stop 3/17/17 at 01:31;  Status DC


Methylprednisolone Sodium Succinate (Solu-Medrol 125mg Vial) 125 mg 1X  ONCE IV

  Last administered on 3/17/17at 01:50;  Start 3/17/17 at 01:30;  Stop 3/17/17 

at 01:31;  Status DC


Info (Do NOT chart on this placeholder) 1 each PRN 1X  PRN MC SEE COMMENTS;  

Start 3/17/17 at 06:15;  Status UNV


Pneumococcal Polyvalent Vaccine (Do NOT chart on this placeholder) 1 each PRN 

1X  PRN MC SEE COMMENTS;  Start 3/17/17 at 06:15;  Status UNV


Influenza Virus Vaccine Quadrival (Fluarix Quad [id number] Syringe) 0.5 ml ONCE 

ONCE VAX IM  Last administered on 3/20/17at 17:55;  Start 3/17/17 at 09:00;  

Stop 3/17/17 at 09:01;  Status DC


Pneumococcal Polyvalent Vaccine (Pneumovax 23) 0.5 ml ONCE ONCE VAX IM  Last 

administered on 3/20/17at 17:54;  Start 3/17/17 at 09:00;  Stop 3/17/17 at 09:01

;  Status DC


Albuterol Sulfate (Ventolin Neb Soln) 2.5 mg RTQID NEB  Last administered on 3/

22/17at 07:11;  Start 3/17/17 at 08:00


Furosemide (Lasix) 20 mg 1X  ONCE IVP  Last administered on 3/17/17at 12:34;  

Start 3/17/17 at 12:30;  Stop 3/17/17 at 12:31;  Status DC


Albuterol/ Ipratropium (Duoneb) 3 ml 1X  ONCE NEB  Last administered on 3/17/

17at 15:43;  Start 3/17/17 at 12:30;  Stop 3/17/17 at 12:31;  Status DC


Potassium Chloride (Klor-Con) 20 meq 1X  ONCE PO  Last administered on 3/17/

17at 12:35;  Start 3/17/17 at 12:00;  Stop 3/17/17 at 12:01;  Status DC


Amlodipine Besylate (Norvasc) 5 mg DAILY PO  Last administered on 3/17/17at 12:

34;  Start 3/17/17 at 12:00;  Stop 3/18/17 at 08:41;  Status DC


Furosemide (Lasix) 40 mg BID92 IVP  Last administered on 3/19/17at 14:02;  

Start 3/18/17 at 09:00;  Stop 3/19/17 at 18:42;  Status DC


Carvedilol (Coreg) 3.125 mg BIDWMEALS PO  Last administered on 3/22/17at 08:05;

  Start 3/18/17 at 09:00


Lisinopril (Prinivil) 2.5 mg DAILY PO  Last administered on 3/21/17at 08:20;  

Start 3/18/17 at 09:00


Potassium Chloride (Klor-Con) 40 meq 1X  ONCE PO  Last administered on 3/18/

17at 09:26;  Start 3/18/17 at 08:45;  Stop 3/18/17 at 08:46;  Status DC


Budesonide (Pulmicort) 0.5 mg RTBID NEB  Last administered on 3/22/17at 07:11;  

Start 3/18/17 at 20:00


Insulin Aspart (Novolog) 0-5 UNITS TIDWMEALS SQ ;  Start 3/18/17 at 17:00;  

Stop 3/19/17 at 10:34;  Status DC


Dextrose 12.5 gm PRN Q15MIN  PRN IV SEE COMMENTS;  Start 3/18/17 at 12:15;  

Stop 3/19/17 at 10:34;  Status DC


Guaifenesin (Mucinex) 600 mg BID PO  Last administered on 3/22/17at 08:01;  

Start 3/19/17 at 11:00


Cefpodoxime Proxetil (Vantin) 100 mg BID PO  Last administered on 3/22/17at 08:

02;  Start 3/19/17 at 11:00


Furosemide (Lasix) 40 mg DAILY PO  Last administered on 3/21/17at 08:20;  Start 

3/21/17 at 09:00


Potassium Chloride (Klor-Con) 10 meq DAILYWBKFT PO  Last administered on 3/22/

17at 08:02;  Start 3/21/17 at 08:15


Aspirin (Ecotrin) 81 mg DAILYWBKFT PO  Last administered on 3/22/17at 08:02;  

Start 3/21/17 at 11:00





Active Scripts


Active


Reported


No Known Medications Prior To Admisstion (Info)  Each 1 Each 


Vitals/I & O





 Vital Sign - Last 24 Hours








 3/21/17 3/21/17 3/21/17 3/21/17





 10:09 11:25 14:19 16:48


 


Temp 98.7  98.1 





 98.7  98.1 


 


Pulse 81  79 


 


Resp 18  19 


 


B/P 121/73  127/64 


 


Pulse Ox 96  95 95


 


O2 Delivery Room Air Room Air Room Air Room Air


 


    





    





 3/21/17 3/21/17 3/21/17 3/21/17





 17:00 19:10 19:28 19:45


 


Temp  98.3  





  98.3  


 


Pulse 80 83  


 


Resp  18  


 


B/P 90/54 107/69  


 


Pulse Ox  95  


 


O2 Delivery  Room Air Room Air Room Air


 


    





    





 3/21/17 3/22/17 3/22/17 3/22/17





 22:45 02:55 07:12 07:29


 


Temp 98.3 97.6  97.8





 98.3 97.6  97.8


 


Pulse 83 81  87


 


Resp 18 18  18


 


B/P 122/79 125/68  110/66


 


Pulse Ox 96 95 95 96


 


O2 Delivery Room Air Room Air Room Air Room Air


 


    





    





 3/22/17   





 08:05   


 


Pulse 95   


 


B/P 109/66   














 Intake and Output   


 


 3/21/17 3/21/17 3/22/17





 15:00 23:00 07:00


 


Intake Total   240 ml


 


Output Total  625 ml 


 


Balance  -625 ml 240 ml














SANDRA VYAS MD Mar 22, 2017 08:31

## 2017-03-22 NOTE — RAD
Indication shortness of air. Chest pain.



A single view of the chest was obtained. Comparison is made to an examination

3 days earlier.



There is mild cardiomegaly. There is volume loss at the lung bases compatible

with pleural fluid and atelectasis. The amount of pleural fluid has increased

relative to the previous study. There are changes compatible with mild

congestive heart failure.



IMPRESSION: Slight interval worsening in the appearance of the chest

## 2017-03-22 NOTE — PDOC
Provider Note


Provider Note


Pt is able to lay supine.  No SOA, no CP.  Proceed with C today for NSTEMI/

cardiomyopathy.  Will try to for radial approach and will place on 1:1 

supervision post cath given her periods of confusion and forgetfulness to 

promote safety.








FARAZ DISLA Mar 22, 2017 10:51

## 2017-03-22 NOTE — CARD
--------------- APPROVED REPORT --------------





Procedure(s) performed: Coronary Angiography

PTCA with Stenting LAD

181 Omnipaque

18.3 Fluoro time

[id number]

136.20mGy



HISTORY

The patient is a 88 year-old female with a history of : hypertension.



INDICATION

The indication(s) include : non-STEMI , dyspnea.



PROCEDURE NARRATIVE

The patient was brought electively to the cardiac catheterization lab.  A timeout was performed confi
rming the patient's name, date of birth, procedure, and site of procedure.  All necessary personnel w
ere wearing the appropriate protective equipment and radiation monitor devices.  After explaining the
 risks and benefits of the procedure and alternatives, informed consent was obtained. (See nursing no
zenaida for medications administered).  The right wrist was sterilely prepped and draped in the usual fas
hion.  The right wrist was infiltrated with 1 mL of 2% lidocaine for subcutaneous anesthesia.  A 6 Fr
ench Terumo glide sheath was inserted into the right radial artery without difficulty.  Right and lef
t coronary angiography was performed using a 6Fr TIG 4.0 catheter.



CORONARY ANGIOGRAPHY: 

LM is a large caliber vessel with normal angiographic appearance. 

LAD is a large caliber vessel with a proximal 99% stenosis with AVINASH 2 flow. 

D1 is a moderate caliber vessel with normal angiographic apeparance. 

LCx is a moderate caliber non-dominant vessel with mild irregularities of up to 40%. 

OM1 is a moderate caliber vessel with normal angiographic appearance. 

RCA is a large caliber dominant vessel with a long proximal to mid 50% stenosis.  

RPDA and RPL are moderate caliber vessels with normal angiographic appearance.



INTERVENTIONAL TECHNIQUE: 

Heparin and tirofiban were used for anticoagulation. Given the critical LAD stenosis a conversation w
as held with the patient's DURABLE POWER OF  prior to intervention given her high risk state 
with multiple valvular abnormalities and severe LV systolic dysfunction. After confirmation to michi foley, through a 6 [place] EBU 3.5 guide catheter a 0.014 inch per water wire was advanced to the distal L
AD and the proximal LAD was angioplastied in a serial fashion with a 2.0 and 2.5 mm balloon. The lesi
on was then stented with a 30 by 18 vision bare metal stent. The proximal aspect of the stent was the
n postdilated with a 3.5 mm noncompliant balloon. There was excellent stent expansion with AVINASH 3 nima
w in the vessel. No dissection was noted.  All catheter exchanges and advancements were performed ove
r a guidewire.  At case completion the right radial sheath was removed and a Terumo radial band was a
pplied with 13 ml of air.  The patient tolerated the procedure well and there were no immediate compl
ications.



Conclusion

1. Critical LAD disease stented with a 3.0/18 bare metal stent, post-dilated with a 3.5mm NC balloon.
 





Recommendations

ASA 81mg daily indefinitely

Plavix 75mg daily x 6 weeks, lifelong if tolerated. 

Will discuss with family with regards to further evaluation for aortic stenosis including aortic valv
uloplasty.

## 2017-03-22 NOTE — DS
DATE OF DISCHARGE:  03/22/2017



CHIEF COMPLAINT:  Lower extremity edema.



HISTORY OF PRESENT ILLNESS:  The patient is an 88-year-old female who was

brought to the Emergency Room by her family with the above complaint.  She has a

history of some chronic lower extremity edema, but the family noticed that this

had worsened recently.  Evaluation in the Emergency Room included a chest x-ray,

which showed pulmonary edema.  The EKG was without acute ischemic change.  The

patient was initially not hypoxic on room air, but then became hypoxic.  She was

placed on a non-rebreather mask, treated with one dose of Lasix in the

Emergency Room and admitted for further treatment.



HOSPITAL COURSE:  The patient was admitted and placed on telemetry where she

remained in sinus rhythm.  She was seen in consultation by Cardiology.  Troponin

was elevated at 0.666 and slowly decreased thereafter.  The patient did not

complain of any chest pain, but Cardiology felt she had probably sustained a

non-ST elevated myocardial infarction.  She was continued on Lasix and had some

good diuresis with this.  Her respiratory status quickly improved.  Followup 
chest x-ray

showed improvement of the pulmonary vascular congestion, although some pleural

effusions persisted.   Cardiology started the patient on low dose of lisinopril

and Coreg and she has been tolerating these.  Echocardiogram on 03/17/2017

showed moderately decreased systolic ejection fraction to 35% with moderate

global hypokinesis of the left ventricle, moderately severe aortic stenosis was

also seen along with moderate aortic mitral and tricuspid regurgitation. 

Cardiology has attempted to do a cardiac catheterization for further evaluation.

 However, the patient has repeatedly told them she is unable to lie flat without

difficulty breathing and therefore cardiac catheterization has been postponed.



The patient has a history of glucose intolerance.  She has not been on any

medication for blood sugar.  She initially had some elevated glucose, but this

was due to the high dose of Solu-Medrol she received in the Emergency Room.  Her

A1c was 5.6.  She was placed on an ADA diet, but no fingersticks or sliding

scale are indicated at this time, as she has been basically euglycemic for the

past several days.  TSH was within normal limits.  Lipids were quite low with a

total cholesterol of 117 and an LDL of 55 and therefore statin treatment is not

indicated at this time.  The patient has a history of dementia.  She appears to

be at her baseline mental status.  She is calm and cooperative with her care and

no medication is presently given for this.  She was seen by physical and

occupational therapy and noted to have significant debility.



Discharge plans were discussed with her daughter by the .  The

daughter was in agreement with having her transferred to [facility], as

she had been a patient there before.  She lived with her daughter at home prior

to admission.  If Cardiology is unable to perform cardiac catheterization today,

the patient will be transferred to [facility].



ADDENDUM: Patient underwent cardiac catheterization on 3/22/17. This showed a 99
% stenosis in the proximal LAD. Angioplasty was performed and a bare metal 
stent was placed. Some mild diffuse CAD was also seen. Patient had some 
increased shortness of air later that day. Chest xray showed increased CHF and 
she was treated with one extra dose of Lasix. Magnesium was mildly low and she 
was started on po replacement of this. Patient's respiratory status is now 
stable with oxygen per NC. The possibility of aortic valve replacement was 
discussed with patient's daughter by Cardiology. She does not appear to be a 
good surgical candidate at this time. This will be reevaluated by Cardiology as 
an outpatient. 





FINAL DIAGNOSES:

1.  Non-ST elevated myocardial infarction.

2.  Acute respiratory failure.

3.  Acute exacerbation of systolic congestive heart failure.

4.  Aortic stenosis.

5.  Glucose intolerance.

6.  Chronic venous insufficiency.

7.  Dementia.

8.  Debility.

9. Coronary artery disease.



DISCHARGE MEDICATIONS:  Aspirin 81 mg daily, plavix 75 mg daily, metoprolol 
tartrate 12.5 mg bid,

furosemide 40 mg daily, lisinopril 2.5 mg daily, magnesium chloride ER 64 mg 
daily, Duonebs prn, and potassium 10 mEq bid



The patient is to be on an ADA diet.  She is to have ROSAS hose on daily when out

of bed.

 



______________________________

SANDRA VYAS MD



DR:  ROGELIO/nathalia  JOB#:  [id number] / [id number]

DD:  03/22/2017 08:44  DT:  03/22/2017 12:04

GAMAL

## 2017-03-23 VITALS — SYSTOLIC BLOOD PRESSURE: 107 MMHG | DIASTOLIC BLOOD PRESSURE: 64 MMHG

## 2017-03-23 VITALS — SYSTOLIC BLOOD PRESSURE: 94 MMHG | DIASTOLIC BLOOD PRESSURE: 52 MMHG

## 2017-03-23 VITALS — SYSTOLIC BLOOD PRESSURE: 99 MMHG | DIASTOLIC BLOOD PRESSURE: 54 MMHG

## 2017-03-23 VITALS — SYSTOLIC BLOOD PRESSURE: 86 MMHG | DIASTOLIC BLOOD PRESSURE: 53 MMHG

## 2017-03-23 VITALS — SYSTOLIC BLOOD PRESSURE: 107 MMHG | DIASTOLIC BLOOD PRESSURE: 47 MMHG

## 2017-03-23 VITALS — SYSTOLIC BLOOD PRESSURE: 103 MMHG | DIASTOLIC BLOOD PRESSURE: 56 MMHG

## 2017-03-23 LAB
ANION GAP SERPL CALC-SCNC: 6 MMOL/L (ref 6–14)
BUN SERPL-MCNC: 16 MG/DL (ref 7–20)
CALCIUM SERPL-MCNC: 8.6 MG/DL (ref 8.5–10.1)
CHLORIDE SERPL-SCNC: 102 MMOL/L (ref 98–107)
CO2 SERPL-SCNC: 35 MMOL/L (ref 21–32)
CREAT SERPL-MCNC: 0.8 MG/DL (ref 0.6–1)
GFR SERPLBLD BASED ON 1.73 SQ M-ARVRAT: 81.9 ML/MIN
GLUCOSE SERPL-MCNC: 86 MG/DL (ref 70–99)
MAGNESIUM SERPL-MCNC: 1.6 MG/DL (ref 1.8–2.4)
POTASSIUM SERPL-SCNC: 3.3 MMOL/L (ref 3.5–5.1)
SODIUM SERPL-SCNC: 143 MMOL/L (ref 136–145)

## 2017-03-23 RX ADMIN — Medication SCH MG: at 09:41

## 2017-03-23 RX ADMIN — ASPIRIN SCH MG: 81 TABLET, COATED ORAL at 09:41

## 2017-03-23 RX ADMIN — POTASSIUM CHLORIDE SCH MEQ: 750 TABLET, FILM COATED, EXTENDED RELEASE ORAL at 20:05

## 2017-03-23 RX ADMIN — IPRATROPIUM BROMIDE AND ALBUTEROL SULFATE SCH ML: .5; 3 SOLUTION RESPIRATORY (INHALATION) at 07:09

## 2017-03-23 RX ADMIN — IPRATROPIUM BROMIDE AND ALBUTEROL SULFATE SCH ML: .5; 3 SOLUTION RESPIRATORY (INHALATION) at 15:30

## 2017-03-23 RX ADMIN — LISINOPRIL SCH MG: 2.5 TABLET ORAL at 12:14

## 2017-03-23 RX ADMIN — CLOPIDOGREL BISULFATE SCH MG: 75 TABLET ORAL at 09:44

## 2017-03-23 RX ADMIN — POTASSIUM CHLORIDE SCH MEQ: 750 TABLET, FILM COATED, EXTENDED RELEASE ORAL at 14:00

## 2017-03-23 RX ADMIN — POTASSIUM CHLORIDE SCH MEQ: 750 TABLET, FILM COATED, EXTENDED RELEASE ORAL at 09:00

## 2017-03-23 RX ADMIN — CARVEDILOL SCH MG: 3.12 TABLET, FILM COATED ORAL at 12:15

## 2017-03-23 RX ADMIN — METOPROLOL TARTRATE SCH MG: 25 TABLET, FILM COATED ORAL at 20:05

## 2017-03-23 RX ADMIN — IPRATROPIUM BROMIDE AND ALBUTEROL SULFATE SCH ML: .5; 3 SOLUTION RESPIRATORY (INHALATION) at 19:54

## 2017-03-23 RX ADMIN — Medication SCH MG: at 09:00

## 2017-03-23 NOTE — PDOC
FARAZ DISLA APRN 3/23/17 1048:


CARDIO Progress Notes


Date and Time


Date of Service


3/23/2017


Time of Evaluation


0920





Subjective


Subjective:  No Chest Pain, No shortness of breath, No Palpitations, Other (

feels better today)





Vitals


Vitals





 Vital Signs








  Date Time  Temp Pulse Resp B/P Pulse Ox O2 Delivery O2 Flow Rate FiO2


 


3/23/17 07:10     95 Nasal Cannula 2.0 


 


3/23/17 07:00 97.9 84 17 99/54    





 97.9       








Weight


Weight [ ]





Input and Output


Intake and Output











 Intake and Output 


 


 3/23/17





 07:00


 


Intake Total 1114.6 ml


 


Balance 1114.6 ml


 


 


 


Intake Oral 350 ml


 


IV Total 764.6 ml


 


# Voids 7











Laboratory


Labs





Laboratory Tests








Test


  3/23/17


07:00


 


Sodium Level


  143mmol/L


(136-145)


 


Potassium Level


  3.3mmol/L


(3.5-5.1)


 


Chloride Level


  102mmol/L


()


 


Carbon Dioxide Level


  35mmol/L


(21-32)


 


Anion Gap 6 (6-14) 


 


Blood Urea Nitrogen 16mg/dL (7-20) 


 


Creatinine


  0.8mg/dL


(0.6-1.0)


 


Estimated GFR


(Cockcroft-Gault) 81.9 


 


 


Glucose Level


  86mg/dL


(70-99)


 


Calcium Level


  8.6mg/dL


(8.5-10.1)


 


Magnesium Level


  1.6mg/dL


(1.8-2.4)











Physical Exam


HEENT:  Neck Supple W Full Motion


Chest:  Symmetric


LUNGS:  Other (faint bibasilar crackles)


Heart:  S1S2, RRR (SR with intermittent PVCs. )


Abdomen:  Soft N/T


Extremities:  Other (2+ bilateral LE pitting edema)


Neurology:  alert, oriented, follow commands, other (periods of confusion)


Other Exams


right radial arteriotomy site intact, no swelling nor erythema. Neurovascular 

status intact





Assessment


Assessment


1.  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


2.  ICM: EF 35%


3.  Moderate-severe aortic stenosis


4.  NSTEMI/CAD: POD#1 PCI/BMS to LAD. Did well. 


5.  HTN: controlled


6.  Chronic venous insufficiency


7.  Dementia





Plan


1. Continue with po lasix therapy with KCL replacement. Replace Mg x1


2. DAPT (ECASA 81 mg indefinitely and plavix for at least 6 weeks). 


3. Continue with optimization and secondary prevention. Change coreg to 

metoprolol. 


4. Valvuloplasty to be discussed with family per primary cardiologist. If 

agrees then possibly Monday. 


5. No statin for now, lipids significantly well controlled without statin. 


6. Will reeval in 3 months her EF post optimization period via f/u TTE.





REGINA MAGANA MD 3/23/17 1538:


CARDIO Progress Notes


Plan


Plan


Patient seen and examined. Agree with above nurse practitioner note.


No acute events overnight. Doing well status post percutaneous coronary 

intervention.


The patient denies any specific cardiac limitations. She overall does not have 

a good comprehension of what is currently going on with her health care.


I had a long discussion again with the patient's family member (her daughter) 

and ultimately explained to her that given her mother's current state, age and 

other valvular comorbidities and severe cardiomyopathy that her expected 

survival rate is low. Regardless of having her aortic valve fixed she may not 

have a long term life expectancy.


I discussed with the family that we will continue medication titration over the 

next week or 2 and reevaluate her symptoms. If she has significant symptoms 

then we could consider aortic valvuloplasty and I went over the risks and 

benefits of the procedure with the patient's daughter.





Given the patient's multiple comorbidities including severe dementia she would 

be a poor candidate for transcatheter aortic valve replacement but this may 

also be a consideration.








FARAZ DISLA Mar 23, 2017 10:48


REGINA MAGANA MD Mar 23, 2017 15:38

## 2017-03-23 NOTE — PDOC
PROGRESS NOTES


Subjective


Subjective


Patient sleepy, denies CP or SOA.





Objective


Objective





 Vital Signs








  Date Time  Temp Pulse Resp B/P Pulse Ox O2 Delivery O2 Flow Rate FiO2


 


3/23/17 07:10     95 Nasal Cannula 2.0 


 


3/23/17 07:00 97.9 84 17 99/54    





 97.9       














 Intake and Output 


 


 3/23/17





 07:00


 


Intake Total 1114.6 ml


 


Balance 1114.6 ml


 


 


 


Intake Oral 350 ml


 


IV Total 764.6 ml


 


# Voids 7











Physical Exam


Abdomen:  Normal bowel sounds, Soft, No tenderness


Heart:  Regular rate, Other (II/VI  systolic murmur)


Extremities:  Other (mild pitting edema (no ROSAS hose) with chronic venous 

stasis changes, no erythema)


General:  Alert, No acute distress


Lungs:  Other (BS decreased at bases)





Assessment


Assessment


 Problems


Medical Problems:


(1) CHF (congestive heart failure)


Status: Acute  





(2) Elevated glucose


Status: Acute  





(3) Pedal edema


Status: Acute  





(4) Pulmonary edema


Status: Acute  











Plan


Plan of Care


1. CAD - Patient found to have 99% stenosis of proximal LAD at cath yesterday, 

tx with angioplasty and stent placement. Continue tx as per Cardiology.


2. acute respiratory failure - patient had some mild respiratory distress with 

CP yesterday afternoon. CXR showed some increased vascular congestion again 

with pleural effusions. Tx with one extra dose of Lasix. Sats stable now at 

rest on O2. IS ordered but not sure patient will be able to follow instructions 

for this.


3. systolic CHF - continue Lasix.


4. debility - continue therapies as tolerated. Still anticipate transfer to  

when stable for discharge.





Comment


Review of Relevant


I have reviewed the following items wally (where applicable) has been applied.


Labs





Laboratory Tests








Test


  3/23/17


07:00


 


Sodium Level


  143mmol/L


(136-145)


 


Potassium Level


  3.3mmol/L


(3.5-5.1)


 


Chloride Level


  102mmol/L


()


 


Carbon Dioxide Level


  35mmol/L


(21-32)


 


Anion Gap 6 (6-14) 


 


Blood Urea Nitrogen 16mg/dL (7-20) 


 


Creatinine


  0.8mg/dL


(0.6-1.0)


 


Estimated GFR


(Cockcroft-Gault) 81.9 


 


 


Glucose Level


  86mg/dL


(70-99)


 


Calcium Level


  8.6mg/dL


(8.5-10.1)


 


Magnesium Level


  1.6mg/dL


(1.8-2.4)








Laboratory Tests








Test


  3/23/17


07:00


 


Sodium Level


  143mmol/L


(136-145)


 


Potassium Level


  3.3mmol/L


(3.5-5.1)


 


Chloride Level


  102mmol/L


()


 


Carbon Dioxide Level


  35mmol/L


(21-32)


 


Anion Gap 6 (6-14) 


 


Blood Urea Nitrogen 16mg/dL (7-20) 


 


Creatinine


  0.8mg/dL


(0.6-1.0)


 


Estimated GFR


(Cockcroft-Gault) 81.9 


 


 


Glucose Level


  86mg/dL


(70-99)


 


Calcium Level


  8.6mg/dL


(8.5-10.1)


 


Magnesium Level


  1.6mg/dL


(1.8-2.4)








Medications





 Current Medications


Aspirin (Children'S Aspirin) 324 mg 1X  ONCE PO  Last administered on 3/17/17at 

00:50;  Start 3/17/17 at 00:30;  Stop 3/17/17 at 00:31;  Status DC


Furosemide (Lasix) 40 mg 1X  ONCE IVP  Last administered on 3/17/17at 00:49;  

Start 3/17/17 at 00:30;  Stop 3/17/17 at 00:31;  Status DC


Ondansetron HCl (Zofran) 4 mg PRN Q8HRS  PRN IV NAUSEA/VOMITING;  Start 3/17/17 

at 00:15;  Stop 3/18/17 at 00:14;  Status DC


Morphine Sulfate 2 mg PRN Q2HR  PRN IV SEVERE PAIN;  Start 3/17/17 at 00:15;  

Stop 3/18/17 at 00:14;  Status DC


Potassium Chloride (Klor-Con) 40 meq 1X  ONCE PO  Last administered on 3/17/

17at 00:50;  Start 3/17/17 at 00:30;  Stop 3/17/17 at 00:31;  Status DC


Albuterol/ Ipratropium (Duoneb) 3 ml 1X  ONCE NEB  Last administered on 3/17/

17at 01:20;  Start 3/17/17 at 01:30;  Stop 3/17/17 at 01:31;  Status DC


Methylprednisolone Sodium Succinate (Solu-Medrol 125mg Vial) 125 mg 1X  ONCE IV

  Last administered on 3/17/17at 01:50;  Start 3/17/17 at 01:30;  Stop 3/17/17 

at 01:31;  Status DC


Info (Do NOT chart on this placeholder) 1 each PRN 1X  PRN MC SEE COMMENTS;  

Start 3/17/17 at 06:15;  Status UNV


Pneumococcal Polyvalent Vaccine (Do NOT chart on this placeholder) 1 each PRN 

1X  PRN MC SEE COMMENTS;  Start 3/17/17 at 06:15;  Status UNV


Influenza Virus Vaccine Quadrival (Fluarix Quad [id number] Syringe) 0.5 ml ONCE 

ONCE VAX IM  Last administered on 3/20/17at 17:55;  Start 3/17/17 at 09:00;  

Stop 3/17/17 at 09:01;  Status DC


Pneumococcal Polyvalent Vaccine (Pneumovax 23) 0.5 ml ONCE ONCE VAX IM  Last 

administered on 3/20/17at 17:54;  Start 3/17/17 at 09:00;  Stop 3/17/17 at 09:01

;  Status DC


Albuterol Sulfate (Ventolin Neb Soln) 2.5 mg RTQID NEB  Last administered on 3/

22/17at 07:11;  Start 3/17/17 at 08:00;  Stop 3/22/17 at 08:22;  Status DC


Furosemide (Lasix) 20 mg 1X  ONCE IVP  Last administered on 3/17/17at 12:34;  

Start 3/17/17 at 12:30;  Stop 3/17/17 at 12:31;  Status DC


Albuterol/ Ipratropium (Duoneb) 3 ml 1X  ONCE NEB  Last administered on 3/17/

17at 15:43;  Start 3/17/17 at 12:30;  Stop 3/17/17 at 12:31;  Status DC


Potassium Chloride (Klor-Con) 20 meq 1X  ONCE PO  Last administered on 3/17/

17at 12:35;  Start 3/17/17 at 12:00;  Stop 3/17/17 at 12:01;  Status DC


Amlodipine Besylate (Norvasc) 5 mg DAILY PO  Last administered on 3/17/17at 12:

34;  Start 3/17/17 at 12:00;  Stop 3/18/17 at 08:41;  Status DC


Furosemide (Lasix) 40 mg BID92 IVP  Last administered on 3/19/17at 14:02;  

Start 3/18/17 at 09:00;  Stop 3/19/17 at 18:42;  Status DC


Carvedilol (Coreg) 3.125 mg BIDWMEALS PO  Last administered on 3/22/17at 18:28;

  Start 3/18/17 at 09:00


Lisinopril (Prinivil) 2.5 mg DAILY PO  Last administered on 3/22/17at 13:07;  

Start 3/18/17 at 09:00


Potassium Chloride (Klor-Con) 40 meq 1X  ONCE PO  Last administered on 3/18/

17at 09:26;  Start 3/18/17 at 08:45;  Stop 3/18/17 at 08:46;  Status DC


Budesonide (Pulmicort) 0.5 mg RTBID NEB  Last administered on 3/22/17at 07:11;  

Start 3/18/17 at 20:00;  Stop 3/22/17 at 08:22;  Status DC


Insulin Aspart (Novolog) 0-5 UNITS TIDWMEALS SQ ;  Start 3/18/17 at 17:00;  

Stop 3/19/17 at 10:34;  Status DC


Dextrose 12.5 gm PRN Q15MIN  PRN IV SEE COMMENTS;  Start 3/18/17 at 12:15;  

Stop 3/19/17 at 10:34;  Status DC


Guaifenesin (Mucinex) 600 mg BID PO  Last administered on 3/22/17at 08:01;  

Start 3/19/17 at 11:00;  Stop 3/22/17 at 08:22;  Status DC


Cefpodoxime Proxetil (Vantin) 100 mg BID PO  Last administered on 3/22/17at 08:

02;  Start 3/19/17 at 11:00;  Stop 3/22/17 at 08:22;  Status DC


Furosemide (Lasix) 40 mg DAILY PO  Last administered on 3/22/17at 13:06;  Start 

3/21/17 at 09:00


Potassium Chloride (Klor-Con) 10 meq DAILYWBKFT PO  Last administered on 3/22/

17at 08:02;  Start 3/21/17 at 08:15


Aspirin (Ecotrin) 81 mg DAILYWBKFT PO  Last administered on 3/22/17at 08:02;  

Start 3/21/17 at 11:00


Iohexol 100 ml 100 ml STK-MED ONCE .ROUTE ;  Start 3/22/17 at 10:44;  Stop 3/22/

17 at 10:45;  Status DC


Heparin Sodium/ Sodium Chloride 500 ml @  As Directed STK-MED ONCE .ROUTE ;  

Start 3/22/17 at 10:44;  Stop 3/22/17 at 10:45;  Status DC


Lidocaine HCl 20 ml STK-MED ONCE .ROUTE ;  Start 3/22/17 at 10:44;  Stop 3/22/

17 at 10:45;  Status DC


Nitroglycerin (Nitroglycerin) 200 mcg STK-MED ONCE .ROUTE ;  Start 3/22/17 at 10

:46;  Stop 3/22/17 at 10:47;  Status DC


Verapamil HCl (Verapamil) 5 mg STK-MED ONCE .ROUTE ;  Start 3/22/17 at 10:47;  

Stop 3/22/17 at 10:48;  Status DC


Heparin Sodium (Porcine) 10,000 unit STK-MED ONCE .ROUTE ;  Start 3/22/17 at 10:

47;  Stop 3/22/17 at 10:48;  Status DC


Fentanyl Citrate (Fentanyl 2ml Vial) 100 mcg STK-MED ONCE .ROUTE ;  Start 3/22/

17 at 10:47;  Stop 3/22/17 at 10:48;  Status DC


Midazolam HCl (Versed) 2 mg STK-MED ONCE .ROUTE ;  Start 3/22/17 at 10:47;  

Stop 3/22/17 at 10:48;  Status DC


Nitroglycerin (Nitroglycerin) 200 mcg 1X  ONCE IART  Last administered on 3/22/

17at 12:17;  Start 3/22/17 at 11:15;  Stop 3/22/17 at 11:22;  Status DC


Verapamil HCl (Verapamil) 2.5 mg 1X  ONCE IART  Last administered on 3/22/17at 

12:18;  Start 3/22/17 at 11:15;  Stop 3/22/17 at 11:22;  Status DC


Heparin Sodium (Porcine) 2,500 unit 1X  ONCE IART  Last administered on 3/22/

17at 12:26;  Start 3/22/17 at 11:15;  Stop 3/22/17 at 11:22;  Status DC


Heparin Sodium/ Sodium Chloride 1,000 unit 1X  ONCE IART  Last administered on 3

/22/17at 12:17;  Start 3/22/17 at 11:15;  Stop 3/22/17 at 11:22;  Status DC


Heparin Sodium/ Sodium Chloride 1,000 unit 1X  ONCE IART  Last administered on 3

/22/17at 12:17;  Start 3/22/17 at 11:15;  Stop 3/22/17 at 11:22;  Status DC


Midazolam HCl (Versed) 1 mg 1X  ONCE IV  Last administered on 3/22/17at 12:18;  

Start 3/22/17 at 11:15;  Stop 3/22/17 at 11:22;  Status DC


Fentanyl Citrate (Fentanyl 2ml Vial) 25 mcg 1X  ONCE IV  Last administered on 3/

22/17at 12:19;  Start 3/22/17 at 11:15;  Stop 3/22/17 at 11:22;  Status DC


Iohexol (Omnipaque 300 Mg/ml) 100 ml 1X  ONCE IART  Last administered on 3/22/

17at 12:16;  Start 3/22/17 at 11:15;  Stop 3/22/17 at 11:22;  Status DC


Lidocaine HCl 1 ml 1X  ONCE IJ  Last administered on 3/22/17at 12:17;  Start 3/

22/17 at 11:15;  Stop 3/22/17 at 11:22;  Status DC


Info 1 each 1 each PRN DAILY  PRN MC SEE COMMENTS;  Start 3/22/17 at 11:30;  

Stop 3/24/17 at 11:29


Tirofiban/Sodium Chloride (Aggrastat 12.5 Mg/250 ml Premix) 250 ml @  As 

Directed STK-MED ONCE IV ;  Start 3/22/17 at 11:51;  Stop 3/22/17 at 11:52;  

Status DC


Nitroglycerin (Nitroglycerin) 200 mcg 1X  ONCE ICAR  Last administered on 3/22/

17at 12:17;  Start 3/22/17 at 12:00;  Stop 3/22/17 at 12:05;  Status DC


Heparin Sodium (Porcine) 4000 unit 4,000 unit 1X  ONCE IV  Last administered on 

3/22/17at 12:27;  Start 3/22/17 at 11:43;  Stop 3/22/17 at 12:05;  Status DC


Tirofiban/Sodium Chloride 250 ml @ 0 mls/hr CONT  PRN IV PER PROTOCOL;  Start 3/

22/17 at 12:00;  Stop 3/22/17 at 12:04;  Status DC


Tirofiban/Sodium Chloride (Aggrastat 12.5 Mg/250 ml Premix) 250 ml @ 0 mls/hr 

CONT  PRN IV PER PROTOCOL;  Start 3/22/17 at 12:15;  Stop 3/22/17 at 12:15;  

Status DC


Ticagrelor (Brilinta) 90 mg STK-MED ONCE .ROUTE ;  Start 3/22/17 at 12:06;  

Stop 3/22/17 at 12:07;  Status DC


Ticagrelor 180 mg 180 mg 1X  ONCE PO  Last administered on 3/22/17at 12:25;  

Start 3/22/17 at 12:15;  Stop 3/22/17 at 12:16;  Status DC


Tirofiban/Sodium Chloride (Aggrastat 12.5 Mg/250 ml Premix) 250 ml @ 0 mls/hr 

CONT  PRN IV PER PROTOCOL Last administered on 3/22/17at 12:28;  Start 3/22/17 

at 11:54


Albuterol/ Ipratropium (Duoneb) 3 ml RTQID NEB  Last administered on 3/23/17at 

07:09;  Start 3/22/17 at 16:00


Albuterol Sulfate (Ventolin Neb Soln) 2.5 mg 1X  ONCE NEB ;  Start 3/22/17 at 13

:45;  Stop 3/22/17 at 13:49;  Status DC


Lorazepam (Ativan) 1 mg PRN Q8HRS  PRN IV ANXIETY;  Start 3/22/17 at 14:00;  

Stop 3/22/17 at 14:00;  Status DC


Lorazepam (Ativan) 1 mg PRN Q6HRS  PRN PO ANXIETY / AGITATION;  Start 3/22/17 

at 14:00


Lorazepam 0.5 mg 0.5 mg 1X  ONCE IV  Last administered on 3/22/17at 14:19;  

Start 3/22/17 at 14:00;  Stop 3/22/17 at 14:08;  Status DC


Sodium Chloride (Iv Sodium Chloride 0.9% 1000ml Bag) 1,000 ml @  60 mls/hr 1X  

ONCE IV  Last administered on 3/22/17at 18:27;  Start 3/22/17 at 16:00;  Stop 3/

23/17 at 08:39;  Status DC


Furosemide 20 mg 20 mg 1X  ONCE IVP  Last administered on 3/22/17at 18:27;  

Start 3/22/17 at 16:00;  Stop 3/22/17 at 16:01;  Status DC


Magnesium Sulfate/ Dextrose (Magnesium Sulfate PREMIX 2GM) 50 ml @ 25 mls/hr 1X

  ONCE IV ;  Start 3/23/17 at 08:15;  Stop 3/23/17 at 10:14


Potassium Chloride (Klor-Con) 40 meq 1X  ONCE PO ;  Start 3/23/17 at 08:15;  

Stop 3/23/17 at 08:16;  Status DC


Clopidogrel Bisulfate (Plavix) 75 mg DAILYWBKFT PO ;  Start 3/23/17 at 08:15





Active Scripts


Active


Reported


No Known Medications Prior To Admisstion (Info)  Each 1 Each 


Vitals/I & O





 Vital Sign - Last 24 Hours








 3/22/17 3/22/17 3/22/17 3/22/17





 10:17 12:14 12:18 12:19


 


Temp 98.0   





 98.0   


 


Pulse 74 80 80 


 


Resp 18 24  24


 


B/P 115/64  132/80 


 


Pulse Ox 94 98  98


 


O2 Delivery Room Air Room Air  Room Air


 


    





    





 3/22/17 3/22/17 3/22/17 3/22/17





 12:30 12:45 13:00 13:07


 


Pulse  82 76 84


 


B/P 133/79 131/85 154/87 131/85


 


Pulse Ox  94 94 


 


O2 Delivery  Room Air Room Air 





 3/22/17 3/22/17 3/22/17 3/22/17





 13:15 13:44 13:45 15:00


 


Pulse 82  90 68


 


B/P 180/77  163/91 138/85


 


Pulse Ox 98 94 95 98


 


O2 Delivery Room Air Room Air Room Air Room Air





 3/22/17 3/22/17 3/22/17 3/22/17





 15:30 16:00 16:30 16:39


 


Pulse 70 66 72 


 


B/P 135/62 129/69 157/72 


 


Pulse Ox 100 98  


 


O2 Delivery Nasal Cannula Room Air Nasal Cannula Room Air


 


O2 Flow Rate 2.0  2.0 





 3/22/17 3/22/17 3/22/17 3/22/17





 17:00 17:30 18:00 18:28


 


Pulse 74 78 80 85


 


B/P 138/79 135/77 145/101 145/101


 


Pulse Ox  100 98 


 


O2 Delivery Nasal Cannula Nasal Cannula Nasal Cannula 


 


O2 Flow Rate 2.0 2.0 2.0 





 3/22/17 3/22/17 3/22/17 3/22/17





 18:30 19:00 19:22 19:40


 


Pulse 90 86  


 


B/P 142/64 127/85  


 


Pulse Ox 100 96 95 


 


O2 Delivery Nasal Cannula Nasal Cannula Nasal Cannula Room Air


 


O2 Flow Rate 2.0 2.0 2.0 





 3/22/17 3/23/17 3/23/17 3/23/17





 23:42 03:05 07:00 07:10


 


Temp 97.8 98.4 97.9 





 97.8 98.4 97.9 


 


Pulse 69 75 84 


 


Resp 22 18 17 


 


B/P 153/95 107/64 99/54 


 


Pulse Ox 98 96 98 95


 


O2 Delivery Nasal Cannula Nasal Cannula Nasal Cannula Nasal Cannula


 


O2 Flow Rate 2.0 2.0 2.0 2.0














 Intake and Output   


 


 3/22/17 3/22/17 3/23/17





 15:00 23:00 07:00


 


Intake Total  420.6 ml 694 ml


 


Balance  420.6 ml 694 ml














SANDRA VYAS MD Mar 23, 2017 09:09

## 2017-03-24 VITALS — SYSTOLIC BLOOD PRESSURE: 98 MMHG | DIASTOLIC BLOOD PRESSURE: 55 MMHG

## 2017-03-24 VITALS — DIASTOLIC BLOOD PRESSURE: 62 MMHG | SYSTOLIC BLOOD PRESSURE: 104 MMHG

## 2017-03-24 VITALS — SYSTOLIC BLOOD PRESSURE: 118 MMHG | DIASTOLIC BLOOD PRESSURE: 59 MMHG

## 2017-03-24 LAB
ANION GAP SERPL CALC-SCNC: 3 MMOL/L (ref 6–14)
BUN SERPL-MCNC: 15 MG/DL (ref 7–20)
CALCIUM SERPL-MCNC: 8.5 MG/DL (ref 8.5–10.1)
CHLORIDE SERPL-SCNC: 103 MMOL/L (ref 98–107)
CO2 SERPL-SCNC: 35 MMOL/L (ref 21–32)
CREAT SERPL-MCNC: 0.9 MG/DL (ref 0.6–1)
GFR SERPLBLD BASED ON 1.73 SQ M-ARVRAT: 71.5 ML/MIN
GLUCOSE SERPL-MCNC: 100 MG/DL (ref 70–99)
MAGNESIUM SERPL-MCNC: 1.9 MG/DL (ref 1.8–2.4)
POTASSIUM SERPL-SCNC: 3.5 MMOL/L (ref 3.5–5.1)
SODIUM SERPL-SCNC: 141 MMOL/L (ref 136–145)

## 2017-03-24 RX ADMIN — Medication SCH MG: at 08:19

## 2017-03-24 RX ADMIN — IPRATROPIUM BROMIDE AND ALBUTEROL SULFATE SCH ML: .5; 3 SOLUTION RESPIRATORY (INHALATION) at 07:36

## 2017-03-24 RX ADMIN — CLOPIDOGREL BISULFATE SCH MG: 75 TABLET ORAL at 08:19

## 2017-03-24 RX ADMIN — ASPIRIN SCH MG: 81 TABLET, COATED ORAL at 08:18

## 2017-03-24 RX ADMIN — LISINOPRIL SCH MG: 2.5 TABLET ORAL at 09:09

## 2017-03-24 RX ADMIN — METOPROLOL TARTRATE SCH MG: 25 TABLET, FILM COATED ORAL at 09:08

## 2017-03-24 NOTE — PDOC
CARDIO Progress Notes


Date and Time


Date of Service


3/24/2017


Time of Evaluation


0910





Subjective


Subjective:  No Chest Pain, No shortness of breath, No Palpitations





Vitals


Vitals





 Vital Signs








  Date Time  Temp Pulse Resp B/P Pulse Ox O2 Delivery O2 Flow Rate FiO2


 


3/24/17 09:09  81  118/59    


 


3/24/17 08:00      Nasal Cannula 2.0 


 


3/24/17 07:36     98   


 


3/24/17 07:26 98.2  18     





 98.2       








Weight


Weight [ ]





Input and Output


Intake and Output











 Intake and Output 


 


 3/24/17





 07:00


 


Intake Total 540 ml


 


Output Total 200 ml


 


Balance 340 ml


 


 


 


Intake Oral 540 ml


 


Output Urine Total 200 ml


 


# Voids 7











Laboratory


Labs





Laboratory Tests








Test


  3/24/17


04:25


 


Sodium Level


  141mmol/L


(136-145)


 


Potassium Level


  3.5mmol/L


(3.5-5.1)


 


Chloride Level


  103mmol/L


()


 


Carbon Dioxide Level


  35mmol/L


(21-32)


 


Anion Gap 3 (6-14) 


 


Blood Urea Nitrogen 15mg/dL (7-20) 


 


Creatinine


  0.9mg/dL


(0.6-1.0)


 


Estimated GFR


(Cockcroft-Gault) 71.5 


 


 


Glucose Level


  100mg/dL


(70-99)


 


Calcium Level


  8.5mg/dL


(8.5-10.1)


 


Magnesium Level


  1.9mg/dL


(1.8-2.4)











Physical Exam


HEENT:  Neck Supple W Full Motion


Chest:  Symmetric


LUNGS:  Other (faint bibasilar crackles)


Heart:  S1S2, RRR (SR with PACs otherwise no significant rhythm ectopies)


Abdomen:  Soft N/T


Extremities:  Other (2+ bilateral LE pitting edema)


Neurology:  alert, oriented, follow commands, other (periods of confusion)





Assessment


Assessment


1.  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 

compensated


2.  ICM: EF 35%


3.  Moderate-severe aortic stenosis: hemodynamically stable


4.  NSTEMI/CAD: POD#2 PCI/BMS to LAD. Doing well. 


5.  HTN: Borderline low at times (expected with her cardiomyopathy) otherwise 

controlled


6.  Chronic venous insufficiency


7.  Dementia





Plan


1. Continue with lasix/KCL


2. DAPT (ECASA 81 mg indefinitely and plavix for at least 6 weeks). 


3. Continue with optimization and secondary prevention. Low dose Metoprolol/

lisinopril


4. OK to DC to [facility] today. Follow up in office next week and will consider for 

valvuloplasty at that time as an outpt per card and daughter discussion


5. No statin for now, lipids significantly well controlled without statin. 


6. Will reeval in 3 months her EF post optimization period via f/u TTE.








FARAZ DISLA Mar 24, 2017 09:18

## 2017-03-24 NOTE — PDOC
PROGRESS NOTES


Subjective


Subjective


Patient resting quietly, denies CP or SOA.





Objective


Objective





 Vital Signs








  Date Time  Temp Pulse Resp B/P Pulse Ox O2 Delivery O2 Flow Rate FiO2


 


3/24/17 03:00 98.7 86 18 104/62 99 Nasal Cannula 2.0 





 98.7       














 Intake and Output 


 


 3/24/17





 07:00


 


Intake Total 540 ml


 


Output Total 200 ml


 


Balance 340 ml


 


 


 


Intake Oral 540 ml


 


Output Urine Total 200 ml


 


# Voids 7











Physical Exam


Abdomen:  Normal bowel sounds, Soft, No tenderness


Heart:  Regular rate, Other (II/VI systolic murmur)


Extremities:  Other (scant edema present, ROSAS hose on)


General:  Alert, No acute distress


Lungs:  Other (BS decreased at bases)





Assessment


Assessment


 Problems


Medical Problems:


(1) CHF (congestive heart failure)


Status: Acute  





(2) Elevated glucose


Status: Acute  





(3) NSTEMI (non-ST elevated myocardial infarction)


Status: Acute  





(4) Pedal edema


Status: Acute  





(5) Pulmonary edema


Status: Acute  











Plan


Plan of Care


1. CAD with NSTEMI - stable following angioplasty with stent placement in 

proximal LAD. Continue ASA and Plavix.


2. CHF with systolic dysfunction - appears compensated at present, continue 

Lasix daily, continue K+ and Mg replacement.


3. AS - patient appears to be a poor surgical candidate at this time. Continue 

medical management and follow up with Cardiology.


4. acute respiratory failure with pleural effusions - stable on O2. Encourage 

IS with assistance.


5. glucose intolerance - diet controlled.


6. chronic venous insufficiency - much improved with ROSAS hose, continue.


7. dementia - continue supportive care.


8. debility - to PP today, daughter in agreement.





Comment


Review of Relevant


I have reviewed the following items wally (where applicable) has been applied.


Labs





Laboratory Tests








Test


  3/23/17


07:00 3/24/17


04:25


 


Sodium Level


  143mmol/L


(136-145) 141mmol/L


(136-145)


 


Potassium Level


  3.3mmol/L


(3.5-5.1) 3.5mmol/L


(3.5-5.1)


 


Chloride Level


  102mmol/L


() 103mmol/L


()


 


Carbon Dioxide Level


  35mmol/L


(21-32) 35mmol/L


(21-32)


 


Anion Gap 6 (6-14)  3 (6-14) 


 


Blood Urea Nitrogen 16mg/dL (7-20)  15mg/dL (7-20) 


 


Creatinine


  0.8mg/dL


(0.6-1.0) 0.9mg/dL


(0.6-1.0)


 


Estimated GFR


(Cockcroft-Gault) 81.9 


  71.5 


 


 


Glucose Level


  86mg/dL


(70-99) 100mg/dL


(70-99)


 


Calcium Level


  8.6mg/dL


(8.5-10.1) 8.5mg/dL


(8.5-10.1)


 


Magnesium Level


  1.6mg/dL


(1.8-2.4) 1.9mg/dL


(1.8-2.4)








Laboratory Tests








Test


  3/24/17


04:25


 


Sodium Level


  141mmol/L


(136-145)


 


Potassium Level


  3.5mmol/L


(3.5-5.1)


 


Chloride Level


  103mmol/L


()


 


Carbon Dioxide Level


  35mmol/L


(21-32)


 


Anion Gap 3 (6-14) 


 


Blood Urea Nitrogen 15mg/dL (7-20) 


 


Creatinine


  0.9mg/dL


(0.6-1.0)


 


Estimated GFR


(Cockcroft-Gault) 71.5 


 


 


Glucose Level


  100mg/dL


(70-99)


 


Calcium Level


  8.5mg/dL


(8.5-10.1)


 


Magnesium Level


  1.9mg/dL


(1.8-2.4)








Medications





 Current Medications


Aspirin (Children'S Aspirin) 324 mg 1X  ONCE PO  Last administered on 3/17/17at 

00:50;  Start 3/17/17 at 00:30;  Stop 3/17/17 at 00:31;  Status DC


Furosemide (Lasix) 40 mg 1X  ONCE IVP  Last administered on 3/17/17at 00:49;  

Start 3/17/17 at 00:30;  Stop 3/17/17 at 00:31;  Status DC


Ondansetron HCl (Zofran) 4 mg PRN Q8HRS  PRN IV NAUSEA/VOMITING;  Start 3/17/17 

at 00:15;  Stop 3/18/17 at 00:14;  Status DC


Morphine Sulfate 2 mg PRN Q2HR  PRN IV SEVERE PAIN;  Start 3/17/17 at 00:15;  

Stop 3/18/17 at 00:14;  Status DC


Potassium Chloride (Klor-Con) 40 meq 1X  ONCE PO  Last administered on 3/17/

17at 00:50;  Start 3/17/17 at 00:30;  Stop 3/17/17 at 00:31;  Status DC


Albuterol/ Ipratropium (Duoneb) 3 ml 1X  ONCE NEB  Last administered on 3/17/

17at 01:20;  Start 3/17/17 at 01:30;  Stop 3/17/17 at 01:31;  Status DC


Methylprednisolone Sodium Succinate (Solu-Medrol 125mg Vial) 125 mg 1X  ONCE IV

  Last administered on 3/17/17at 01:50;  Start 3/17/17 at 01:30;  Stop 3/17/17 

at 01:31;  Status DC


Info (Do NOT chart on this placeholder) 1 each PRN 1X  PRN MC SEE COMMENTS;  

Start 3/17/17 at 06:15;  Status UNV


Pneumococcal Polyvalent Vaccine (Do NOT chart on this placeholder) 1 each PRN 

1X  PRN MC SEE COMMENTS;  Start 3/17/17 at 06:15;  Status UNV


Influenza Virus Vaccine Quadrival (Fluarix Quad [id number] Syringe) 0.5 ml ONCE 

ONCE VAX IM  Last administered on 3/20/17at 17:55;  Start 3/17/17 at 09:00;  

Stop 3/17/17 at 09:01;  Status DC


Pneumococcal Polyvalent Vaccine (Pneumovax 23) 0.5 ml ONCE ONCE VAX IM  Last 

administered on 3/20/17at 17:54;  Start 3/17/17 at 09:00;  Stop 3/17/17 at 09:01

;  Status DC


Albuterol Sulfate (Ventolin Neb Soln) 2.5 mg RTQID NEB  Last administered on 3/

22/17at 07:11;  Start 3/17/17 at 08:00;  Stop 3/22/17 at 08:22;  Status DC


Furosemide (Lasix) 20 mg 1X  ONCE IVP  Last administered on 3/17/17at 12:34;  

Start 3/17/17 at 12:30;  Stop 3/17/17 at 12:31;  Status DC


Albuterol/ Ipratropium (Duoneb) 3 ml 1X  ONCE NEB  Last administered on 3/17/

17at 15:43;  Start 3/17/17 at 12:30;  Stop 3/17/17 at 12:31;  Status DC


Potassium Chloride (Klor-Con) 20 meq 1X  ONCE PO  Last administered on 3/17/

17at 12:35;  Start 3/17/17 at 12:00;  Stop 3/17/17 at 12:01;  Status DC


Amlodipine Besylate (Norvasc) 5 mg DAILY PO  Last administered on 3/17/17at 12:

34;  Start 3/17/17 at 12:00;  Stop 3/18/17 at 08:41;  Status DC


Furosemide (Lasix) 40 mg BID92 IVP  Last administered on 3/19/17at 14:02;  

Start 3/18/17 at 09:00;  Stop 3/19/17 at 18:42;  Status DC


Carvedilol (Coreg) 3.125 mg BIDWMEALS PO  Last administered on 3/23/17at 12:15;

  Start 3/18/17 at 09:00;  Stop 3/23/17 at 13:01;  Status DC


Lisinopril (Prinivil) 2.5 mg DAILY PO  Last administered on 3/23/17at 12:14;  

Start 3/18/17 at 09:00


Potassium Chloride (Klor-Con) 40 meq 1X  ONCE PO  Last administered on 3/18/

17at 09:26;  Start 3/18/17 at 08:45;  Stop 3/18/17 at 08:46;  Status DC


Budesonide (Pulmicort) 0.5 mg RTBID NEB  Last administered on 3/22/17at 07:11;  

Start 3/18/17 at 20:00;  Stop 3/22/17 at 08:22;  Status DC


Insulin Aspart (Novolog) 0-5 UNITS TIDWMEALS SQ ;  Start 3/18/17 at 17:00;  

Stop 3/19/17 at 10:34;  Status DC


Dextrose 12.5 gm PRN Q15MIN  PRN IV SEE COMMENTS;  Start 3/18/17 at 12:15;  

Stop 3/19/17 at 10:34;  Status DC


Guaifenesin (Mucinex) 600 mg BID PO  Last administered on 3/22/17at 08:01;  

Start 3/19/17 at 11:00;  Stop 3/22/17 at 08:22;  Status DC


Cefpodoxime Proxetil (Vantin) 100 mg BID PO  Last administered on 3/22/17at 08:

02;  Start 3/19/17 at 11:00;  Stop 3/22/17 at 08:22;  Status DC


Furosemide (Lasix) 40 mg DAILY PO  Last administered on 3/23/17at 09:41;  Start 

3/21/17 at 09:00


Potassium Chloride (Klor-Con) 10 meq DAILYWBKFT PO  Last administered on 3/22/

17at 08:02;  Start 3/21/17 at 08:15;  Stop 3/23/17 at 08:58;  Status DC


Aspirin (Ecotrin) 81 mg DAILYWBKFT PO  Last administered on 3/23/17at 09:41;  

Start 3/21/17 at 11:00


Iohexol 100 ml 100 ml STK-MED ONCE .ROUTE ;  Start 3/22/17 at 10:44;  Stop 3/22/

17 at 10:45;  Status DC


Heparin Sodium/ Sodium Chloride 500 ml @  As Directed STK-MED ONCE .ROUTE ;  

Start 3/22/17 at 10:44;  Stop 3/22/17 at 10:45;  Status DC


Lidocaine HCl 20 ml STK-MED ONCE .ROUTE ;  Start 3/22/17 at 10:44;  Stop 3/22/

17 at 10:45;  Status DC


Nitroglycerin (Nitroglycerin) 200 mcg STK-MED ONCE .ROUTE ;  Start 3/22/17 at 10

:46;  Stop 3/22/17 at 10:47;  Status DC


Verapamil HCl (Verapamil) 5 mg STK-MED ONCE .ROUTE ;  Start 3/22/17 at 10:47;  

Stop 3/22/17 at 10:48;  Status DC


Heparin Sodium (Porcine) 10,000 unit STK-MED ONCE .ROUTE ;  Start 3/22/17 at 10:

47;  Stop 3/22/17 at 10:48;  Status DC


Fentanyl Citrate (Fentanyl 2ml Vial) 100 mcg STK-MED ONCE .ROUTE ;  Start 3/22/

17 at 10:47;  Stop 3/22/17 at 10:48;  Status DC


Midazolam HCl (Versed) 2 mg STK-MED ONCE .ROUTE ;  Start 3/22/17 at 10:47;  

Stop 3/22/17 at 10:48;  Status DC


Nitroglycerin (Nitroglycerin) 200 mcg 1X  ONCE IART  Last administered on 3/22/

17at 12:17;  Start 3/22/17 at 11:15;  Stop 3/22/17 at 11:22;  Status DC


Verapamil HCl (Verapamil) 2.5 mg 1X  ONCE IART  Last administered on 3/22/17at 

12:18;  Start 3/22/17 at 11:15;  Stop 3/22/17 at 11:22;  Status DC


Heparin Sodium (Porcine) 2,500 unit 1X  ONCE IART  Last administered on 3/22/

17at 12:26;  Start 3/22/17 at 11:15;  Stop 3/22/17 at 11:22;  Status DC


Heparin Sodium/ Sodium Chloride 1,000 unit 1X  ONCE IART  Last administered on 3

/22/17at 12:17;  Start 3/22/17 at 11:15;  Stop 3/22/17 at 11:22;  Status DC


Heparin Sodium/ Sodium Chloride 1,000 unit 1X  ONCE IART  Last administered on 3

/22/17at 12:17;  Start 3/22/17 at 11:15;  Stop 3/22/17 at 11:22;  Status DC


Midazolam HCl (Versed) 1 mg 1X  ONCE IV  Last administered on 3/22/17at 12:18;  

Start 3/22/17 at 11:15;  Stop 3/22/17 at 11:22;  Status DC


Fentanyl Citrate (Fentanyl 2ml Vial) 25 mcg 1X  ONCE IV  Last administered on 3/

22/17at 12:19;  Start 3/22/17 at 11:15;  Stop 3/22/17 at 11:22;  Status DC


Iohexol (Omnipaque 300 Mg/ml) 100 ml 1X  ONCE IART  Last administered on 3/22/

17at 12:16;  Start 3/22/17 at 11:15;  Stop 3/22/17 at 11:22;  Status DC


Lidocaine HCl 1 ml 1X  ONCE IJ  Last administered on 3/22/17at 12:17;  Start 3/

22/17 at 11:15;  Stop 3/22/17 at 11:22;  Status DC


Info 1 each 1 each PRN DAILY  PRN MC SEE COMMENTS;  Start 3/22/17 at 11:30;  

Stop 3/24/17 at 11:29


Tirofiban/Sodium Chloride (Aggrastat 12.5 Mg/250 ml Premix) 250 ml @  As 

Directed STK-MED ONCE IV ;  Start 3/22/17 at 11:51;  Stop 3/22/17 at 11:52;  

Status DC


Nitroglycerin (Nitroglycerin) 200 mcg 1X  ONCE ICAR  Last administered on 3/22/

17at 12:17;  Start 3/22/17 at 12:00;  Stop 3/22/17 at 12:05;  Status DC


Heparin Sodium (Porcine) 4000 unit 4,000 unit 1X  ONCE IV  Last administered on 

3/22/17at 12:27;  Start 3/22/17 at 11:43;  Stop 3/22/17 at 12:05;  Status DC


Tirofiban/Sodium Chloride 250 ml @ 0 mls/hr CONT  PRN IV PER PROTOCOL;  Start 3/

22/17 at 12:00;  Stop 3/22/17 at 12:04;  Status DC


Tirofiban/Sodium Chloride (Aggrastat 12.5 Mg/250 ml Premix) 250 ml @ 0 mls/hr 

CONT  PRN IV PER PROTOCOL;  Start 3/22/17 at 12:15;  Stop 3/22/17 at 12:15;  

Status DC


Ticagrelor (Brilinta) 90 mg STK-MED ONCE .ROUTE ;  Start 3/22/17 at 12:06;  

Stop 3/22/17 at 12:07;  Status DC


Ticagrelor 180 mg 180 mg 1X  ONCE PO  Last administered on 3/22/17at 12:25;  

Start 3/22/17 at 12:15;  Stop 3/22/17 at 12:16;  Status DC


Tirofiban/Sodium Chloride (Aggrastat 12.5 Mg/250 ml Premix) 250 ml @ 0 mls/hr 

CONT  PRN IV PER PROTOCOL Last administered on 3/22/17at 12:28;  Start 3/22/17 

at 11:54;  Stop 3/23/17 at 13:07;  Status DC


Albuterol/ Ipratropium (Duoneb) 3 ml RTQID NEB  Last administered on 3/23/17at 

19:54;  Start 3/22/17 at 16:00


Albuterol Sulfate (Ventolin Neb Soln) 2.5 mg 1X  ONCE NEB ;  Start 3/22/17 at 13

:45;  Stop 3/22/17 at 13:49;  Status DC


Lorazepam (Ativan) 1 mg PRN Q8HRS  PRN IV ANXIETY;  Start 3/22/17 at 14:00;  

Stop 3/22/17 at 14:00;  Status DC


Lorazepam (Ativan) 1 mg PRN Q6HRS  PRN PO ANXIETY / AGITATION;  Start 3/22/17 

at 14:00


Lorazepam 0.5 mg 0.5 mg 1X  ONCE IV  Last administered on 3/22/17at 14:19;  

Start 3/22/17 at 14:00;  Stop 3/22/17 at 14:08;  Status DC


Sodium Chloride (Iv Sodium Chloride 0.9% 1000ml Bag) 1,000 ml @  60 mls/hr 1X  

ONCE IV  Last administered on 3/22/17at 18:27;  Start 3/22/17 at 16:00;  Stop 3/

23/17 at 08:39;  Status DC


Furosemide 20 mg 20 mg 1X  ONCE IVP  Last administered on 3/22/17at 18:27;  

Start 3/22/17 at 16:00;  Stop 3/22/17 at 16:01;  Status DC


Magnesium Sulfate/ Dextrose (Magnesium Sulfate PREMIX 2GM) 50 ml @ 25 mls/hr 1X

  ONCE IV  Last administered on 3/23/17at 09:45;  Start 3/23/17 at 08:15;  Stop 

3/23/17 at 10:14;  Status DC


Potassium Chloride (Klor-Con) 40 meq 1X  ONCE PO  Last administered on 3/23/

17at 09:42;  Start 3/23/17 at 08:15;  Stop 3/23/17 at 08:16;  Status DC


Clopidogrel Bisulfate (Plavix) 75 mg DAILYWBKFT PO  Last administered on 3/23/

17at 09:44;  Start 3/23/17 at 08:15


Potassium Chloride (Klor-Con) 10 meq TID PO  Last administered on 3/23/17at 20:

05;  Start 3/23/17 at 09:00


Magnesium Chloride (Mag Delay) 64 mg DAILY PO ;  Start 3/23/17 at 09:00


Metoprolol Tartrate (Lopressor) 12.5 mg BID PO ;  Start 3/23/17 at 21:00





Active Scripts


Active


Reported


No Known Medications Prior To Admisstion (Info)  Each 1 Each 


Vitals/I & O





 Vital Sign - Last 24 Hours








 3/23/17 3/23/17 3/23/17 3/23/17





 08:00 11:00 12:14 12:15


 


Temp  98.1  





  98.1  


 


Pulse  64 64 64


 


Resp  17  


 


B/P  103/56 103/56 103/56


 


Pulse Ox  97  


 


O2 Delivery Room Air Nasal Cannula  


 


O2 Flow Rate  2.0  


 


    





    





 3/23/17 3/23/17 3/23/17 3/23/17





 15:00 15:31 19:24 19:35


 


Temp 98.6   97.8





 98.6   97.8


 


Pulse 78   73


 


Resp 18   24


 


B/P 86/53   107/47


 


Pulse Ox 98 95  95


 


O2 Delivery Nasal Cannula Nasal Cannula Nasal Cannula Nasal Cannula


 


O2 Flow Rate 2.0 2.0 2.0 2.0


 


    





    





 3/23/17 3/23/17 3/23/17 3/24/17





 19:55 20:05 23:00 03:00


 


Temp   98.7 98.7





   98.7 98.7


 


Pulse  78 74 86


 


Resp   16 18


 


B/P  86/53 94/52 104/62


 


Pulse Ox 96  98 99


 


O2 Delivery Nasal Cannula  Nasal Cannula Nasal Cannula


 


O2 Flow Rate 2.0  2.0 2.0














 Intake and Output   


 


 3/23/17 3/23/17 3/24/17





 15:00 23:00 07:00


 


Intake Total  340 ml 200 ml


 


Output Total  200 ml 


 


Balance  140 ml 200 ml














SANDRA VYAS MD Mar 24, 2017 07:28

## 2018-01-08 ENCOUNTER — HOSPITAL ENCOUNTER (INPATIENT)
Dept: HOSPITAL 61 - ER | Age: 83
LOS: 4 days | Discharge: HOSPICE | DRG: 292 | End: 2018-01-12
Attending: FAMILY MEDICINE | Admitting: FAMILY MEDICINE
Payer: MEDICARE

## 2018-01-08 DIAGNOSIS — R74.8: ICD-10-CM

## 2018-01-08 DIAGNOSIS — Z86.010: ICD-10-CM

## 2018-01-08 DIAGNOSIS — E87.3: ICD-10-CM

## 2018-01-08 DIAGNOSIS — F03.90: ICD-10-CM

## 2018-01-08 DIAGNOSIS — F32.9: ICD-10-CM

## 2018-01-08 DIAGNOSIS — I35.0: ICD-10-CM

## 2018-01-08 DIAGNOSIS — M19.90: ICD-10-CM

## 2018-01-08 DIAGNOSIS — R79.89: ICD-10-CM

## 2018-01-08 DIAGNOSIS — I11.0: Primary | ICD-10-CM

## 2018-01-08 DIAGNOSIS — Z90.49: ICD-10-CM

## 2018-01-08 DIAGNOSIS — M85.80: ICD-10-CM

## 2018-01-08 DIAGNOSIS — I50.43: ICD-10-CM

## 2018-01-08 DIAGNOSIS — I25.10: ICD-10-CM

## 2018-01-08 DIAGNOSIS — F20.9: ICD-10-CM

## 2018-01-08 DIAGNOSIS — Z51.5: ICD-10-CM

## 2018-01-08 DIAGNOSIS — I48.91: ICD-10-CM

## 2018-01-08 DIAGNOSIS — I87.2: ICD-10-CM

## 2018-01-08 DIAGNOSIS — I25.2: ICD-10-CM

## 2018-01-08 DIAGNOSIS — I25.5: ICD-10-CM

## 2018-01-08 DIAGNOSIS — Z66: ICD-10-CM

## 2018-01-08 LAB
ADD MAN DIFF?: NO
ALBUMIN SERPL-MCNC: 3.3 G/DL (ref 3.4–5)
ALP SERPL-CCNC: 74 U/L (ref 46–116)
ALT (SGPT): 18 U/L (ref 14–59)
ANION GAP SERPL CALC-SCNC: 5 MMOL/L (ref 6–14)
AST SERPL-CCNC: 23 U/L (ref 15–37)
BACTERIA,URINE: (no result) /HPF
BASE EXCESS ABG: 4 MMOL/L (ref -3–3)
BASO #: 0 X10^3/UL (ref 0–0.2)
BASO %: 1 % (ref 0–3)
BILIRUB DIRECT SERPL-MCNC: 0.1 MG/DL (ref 0–0.2)
BILIRUBIN,URINE: NEGATIVE
BLOOD UREA NITROGEN: 18 MG/DL (ref 7–20)
CALCIUM: 8.6 MG/DL (ref 8.5–10.1)
CHLORIDE: 104 MMOL/L (ref 98–107)
CK SERPL-CCNC: 159 U/L (ref 26–192)
CKMB INDEX: 2.6 % (ref 0–4)
CKMB MASS: 4.2 NG/ML (ref 0–3.6)
CLARITY,URINE: (no result)
CO2 SERPL-SCNC: 35 MMOL/L (ref 21–32)
COLOR,URINE: YELLOW
CREAT SERPL-MCNC: 0.9 MG/DL (ref 0.6–1)
EOS #: 0.1 X10^3/UL (ref 0–0.7)
EOS %: 1 % (ref 0–3)
FIO2 ABG: 60
GFR SERPLBLD BASED ON 1.73 SQ M-ARVRAT: 71.3 ML/MIN
GLUCOSE SERPL-MCNC: 112 MG/DL (ref 70–99)
GLUCOSE,URINE: NEGATIVE MG/DL
HCG SERPL-ACNC: 5.8 X10^3/UL (ref 4–11)
HCO3 ABG: 32 MMOL/L (ref 21–28)
HEMATOCRIT: 35.5 % (ref 36–47)
HEMOGLOBIN: 11.1 G/DL (ref 12–15.5)
INFLUENZA A PATIENT: NEGATIVE
INFLUENZA B PATIENT: NEGATIVE
LACTIC ACID: 1.2 MMOL/L (ref 0.4–2)
LIPASE: 73 U/L (ref 73–393)
LYMPH #: 0.8 X10^3/UL (ref 1–4.8)
LYMPH %: 14 % (ref 24–48)
MEAN CORPUSCULAR HEMOGLOBIN: 30 PG (ref 25–35)
MEAN CORPUSCULAR HGB CONC: 31 G/DL (ref 31–37)
MEAN CORPUSCULAR VOLUME: 95 FL (ref 79–100)
MONO #: 0.7 X10^3/UL (ref 0–1.1)
MONO %: 12 % (ref 0–9)
NEUT #: 4.2 X10^3UL (ref 1.8–7.7)
NEUT %: 72 % (ref 31–73)
NITRITE,URINE: POSITIVE
NT-PRO BNP: (no result) PG/ML (ref 0–449)
OBC FLU: (no result)
PCO2 ABG: 64 MMHG (ref 35–46)
PH ABG: 7.31 (ref 7.35–7.45)
PH,URINE: 5
PLATELET COUNT: 192 X10^3/UL (ref 140–400)
PO2 ABG: 267 MMHG (ref 65–108)
POTASSIUM SERPL-SCNC: 4.1 MMOL/L (ref 3.5–5.1)
PROTEIN,URINE: NEGATIVE MG/DL
RBC,URINE: (no result) /HPF (ref 0–2)
RED BLOOD COUNT: 3.74 X10^6/UL (ref 3.5–5.4)
RED CELL DISTRIBUTION WIDTH: 14.5 % (ref 11.5–14.5)
SAT O2 ABG: 99 % (ref 92–99)
SODIUM: 144 MMOL/L (ref 136–145)
SPECIFIC GRAVITY,URINE: 1.01
SQUAMOUS EPITHELIAL CELL,UR: (no result) /LPF
THYROID STIM HORMONE (TSH): 3.82 UIU/ML (ref 0.36–3.74)
TOTAL BILIRUBIN: 0.2 MG/DL (ref 0.2–1)
TOTAL PROTEIN: 7 G/DL (ref 6.4–8.2)
TROPONINI: 0.03 NG/ML (ref 0–0.06)
TROPONINI: < 0.017 NG/ML (ref 0–0.06)
UROBILINOGEN,URINE: 0.2 MG/DL
WBC,URINE: (no result) /HPF (ref 0–4)

## 2018-01-08 PROCEDURE — 83735 ASSAY OF MAGNESIUM: CPT

## 2018-01-08 PROCEDURE — 87086 URINE CULTURE/COLONY COUNT: CPT

## 2018-01-08 PROCEDURE — 81001 URINALYSIS AUTO W/SCOPE: CPT

## 2018-01-08 PROCEDURE — 99291 CRITICAL CARE FIRST HOUR: CPT

## 2018-01-08 PROCEDURE — 93005 ELECTROCARDIOGRAM TRACING: CPT

## 2018-01-08 PROCEDURE — 87804 INFLUENZA ASSAY W/OPTIC: CPT

## 2018-01-08 PROCEDURE — 83605 ASSAY OF LACTIC ACID: CPT

## 2018-01-08 PROCEDURE — 96375 TX/PRO/DX INJ NEW DRUG ADDON: CPT

## 2018-01-08 PROCEDURE — 80076 HEPATIC FUNCTION PANEL: CPT

## 2018-01-08 PROCEDURE — 85025 COMPLETE CBC W/AUTO DIFF WBC: CPT

## 2018-01-08 PROCEDURE — 84484 ASSAY OF TROPONIN QUANT: CPT

## 2018-01-08 PROCEDURE — 36600 WITHDRAWAL OF ARTERIAL BLOOD: CPT

## 2018-01-08 PROCEDURE — 96374 THER/PROPH/DIAG INJ IV PUSH: CPT

## 2018-01-08 PROCEDURE — 87040 BLOOD CULTURE FOR BACTERIA: CPT

## 2018-01-08 PROCEDURE — 82805 BLOOD GASES W/O2 SATURATION: CPT

## 2018-01-08 PROCEDURE — 94644 CONT INHLJ TX 1ST HOUR: CPT

## 2018-01-08 PROCEDURE — 82553 CREATINE MB FRACTION: CPT

## 2018-01-08 PROCEDURE — 83690 ASSAY OF LIPASE: CPT

## 2018-01-08 PROCEDURE — 36415 COLL VENOUS BLD VENIPUNCTURE: CPT

## 2018-01-08 PROCEDURE — 84443 ASSAY THYROID STIM HORMONE: CPT

## 2018-01-08 PROCEDURE — 83880 ASSAY OF NATRIURETIC PEPTIDE: CPT

## 2018-01-08 PROCEDURE — 94760 N-INVAS EAR/PLS OXIMETRY 1: CPT

## 2018-01-08 PROCEDURE — 94640 AIRWAY INHALATION TREATMENT: CPT

## 2018-01-08 PROCEDURE — 87186 SC STD MICRODIL/AGAR DIL: CPT

## 2018-01-08 PROCEDURE — 93306 TTE W/DOPPLER COMPLETE: CPT

## 2018-01-08 PROCEDURE — 80048 BASIC METABOLIC PNL TOTAL CA: CPT

## 2018-01-08 PROCEDURE — 71045 X-RAY EXAM CHEST 1 VIEW: CPT

## 2018-01-08 RX ADMIN — ASPIRIN 81 MG 1 MG: 81 TABLET ORAL at 12:53

## 2018-01-08 RX ADMIN — METHYLPREDNISOLONE SODIUM SUCCINATE 1 MG: 125 INJECTION, POWDER, FOR SOLUTION INTRAMUSCULAR; INTRAVENOUS at 12:52

## 2018-01-08 RX ADMIN — BACITRACIN 1 MLS/HR: 5000 INJECTION, POWDER, FOR SOLUTION INTRAMUSCULAR at 12:54

## 2018-01-08 RX ADMIN — FUROSEMIDE 1 MG: 10 INJECTION, SOLUTION INTRAMUSCULAR; INTRAVENOUS at 16:30

## 2018-01-08 RX ADMIN — BACITRACIN 1 MLS/HR: 5000 INJECTION, POWDER, FOR SOLUTION INTRAMUSCULAR at 12:10

## 2018-01-08 RX ADMIN — ALBUTEROL SULFATE 1 MG: 108 AEROSOL, METERED RESPIRATORY (INHALATION) at 12:28

## 2018-01-08 RX ADMIN — FUROSEMIDE 1 MG: 10 INJECTION, SOLUTION INTRAMUSCULAR; INTRAVENOUS at 13:30

## 2018-01-08 RX ADMIN — IPRATROPIUM BROMIDE 1 MG: 0.5 SOLUTION RESPIRATORY (INHALATION) at 12:28

## 2018-01-08 RX ADMIN — METOPROLOL TARTRATE 1 MG: 25 TABLET ORAL at 20:52

## 2018-01-08 RX ADMIN — IPRATROPIUM BROMIDE AND ALBUTEROL SULFATE 1 ML: .5; 3 SOLUTION RESPIRATORY (INHALATION) at 21:27

## 2018-01-09 LAB
ANION GAP SERPL CALC-SCNC: 8 MMOL/L (ref 6–14)
BLOOD UREA NITROGEN: 22 MG/DL (ref 7–20)
CALCIUM: 8.2 MG/DL (ref 8.5–10.1)
CHLORIDE: 103 MMOL/L (ref 98–107)
CO2 SERPL-SCNC: 31 MMOL/L (ref 21–32)
CREAT SERPL-MCNC: 1.1 MG/DL (ref 0.6–1)
GFR SERPLBLD BASED ON 1.73 SQ M-ARVRAT: 56.6 ML/MIN
GLUCOSE SERPL-MCNC: 126 MG/DL (ref 70–99)
MAGNESIUM: 1.6 MG/DL (ref 1.8–2.4)
POTASSIUM SERPL-SCNC: 3.6 MMOL/L (ref 3.5–5.1)
SODIUM: 142 MMOL/L (ref 136–145)
TROPONINI: 0.02 NG/ML (ref 0–0.06)

## 2018-01-09 RX ADMIN — LISINOPRIL 1 MG: 2.5 TABLET ORAL at 08:59

## 2018-01-09 RX ADMIN — FUROSEMIDE 1 MG: 10 INJECTION, SOLUTION INTRAMUSCULAR; INTRAVENOUS at 08:58

## 2018-01-09 RX ADMIN — ALBUTEROL SULFATE 1 MG: 108 AEROSOL, METERED RESPIRATORY (INHALATION) at 08:34

## 2018-01-09 RX ADMIN — ALBUTEROL SULFATE 1 MG: 108 AEROSOL, METERED RESPIRATORY (INHALATION) at 19:38

## 2018-01-09 RX ADMIN — CARVEDILOL 1 MG: 3.12 TABLET, FILM COATED ORAL at 17:18

## 2018-01-09 RX ADMIN — ALBUTEROL SULFATE 1 MG: 108 AEROSOL, METERED RESPIRATORY (INHALATION) at 12:00

## 2018-01-09 RX ADMIN — ALBUTEROL SULFATE 1 MG: 108 AEROSOL, METERED RESPIRATORY (INHALATION) at 16:20

## 2018-01-09 RX ADMIN — METOPROLOL TARTRATE 1 MG: 25 TABLET ORAL at 09:00

## 2018-01-09 RX ADMIN — ASPIRIN 1 MG: 81 TABLET, COATED ORAL at 08:59

## 2018-01-09 RX ADMIN — POTASSIUM CHLORIDE 1 MEQ: 1500 TABLET, EXTENDED RELEASE ORAL at 08:59

## 2018-01-09 RX ADMIN — Medication 1 MLS/HR: at 11:10

## 2018-01-09 RX ADMIN — CLOPIDOGREL BISULFATE 1 MG: 75 TABLET ORAL at 08:59

## 2018-01-09 RX ADMIN — ALBUTEROL SULFATE 1 MG: 108 AEROSOL, METERED RESPIRATORY (INHALATION) at 04:48

## 2018-01-09 RX ADMIN — FUROSEMIDE 1 MG: 10 INJECTION, SOLUTION INTRAMUSCULAR; INTRAVENOUS at 17:18

## 2018-01-10 LAB
ANION GAP SERPL CALC-SCNC: 5 MMOL/L (ref 6–14)
BLOOD UREA NITROGEN: 26 MG/DL (ref 7–20)
CALCIUM: 8.5 MG/DL (ref 8.5–10.1)
CHLORIDE: 104 MMOL/L (ref 98–107)
CO2 SERPL-SCNC: 37 MMOL/L (ref 21–32)
CREAT SERPL-MCNC: 1.1 MG/DL (ref 0.6–1)
GFR SERPLBLD BASED ON 1.73 SQ M-ARVRAT: 56.6 ML/MIN
GLUCOSE SERPL-MCNC: 161 MG/DL (ref 70–99)
MAGNESIUM: 2.1 MG/DL (ref 1.8–2.4)
POTASSIUM SERPL-SCNC: 3.5 MMOL/L (ref 3.5–5.1)
SODIUM: 146 MMOL/L (ref 136–145)

## 2018-01-10 RX ADMIN — ALBUTEROL SULFATE 1 MG: 108 AEROSOL, METERED RESPIRATORY (INHALATION) at 12:10

## 2018-01-10 RX ADMIN — ALBUTEROL SULFATE 1 MG: 108 AEROSOL, METERED RESPIRATORY (INHALATION) at 20:15

## 2018-01-10 RX ADMIN — METOPROLOL TARTRATE 1 MG: 25 TABLET ORAL at 10:37

## 2018-01-10 RX ADMIN — ALBUTEROL SULFATE 1 MG: 108 AEROSOL, METERED RESPIRATORY (INHALATION) at 16:23

## 2018-01-10 RX ADMIN — POTASSIUM CHLORIDE 1 MEQ: 1500 TABLET, EXTENDED RELEASE ORAL at 10:35

## 2018-01-10 RX ADMIN — LISINOPRIL 1 MG: 2.5 TABLET ORAL at 10:36

## 2018-01-10 RX ADMIN — FUROSEMIDE 1 MG: 10 INJECTION, SOLUTION INTRAMUSCULAR; INTRAVENOUS at 10:36

## 2018-01-10 RX ADMIN — CEFTRIAXONE 1 GM: 1 INJECTION, POWDER, FOR SOLUTION INTRAMUSCULAR; INTRAVENOUS at 20:13

## 2018-01-10 RX ADMIN — METOPROLOL TARTRATE 1 MG: 25 TABLET ORAL at 20:32

## 2018-01-10 RX ADMIN — ASPIRIN 1 MG: 81 TABLET, COATED ORAL at 10:36

## 2018-01-10 RX ADMIN — CLOPIDOGREL BISULFATE 1 MG: 75 TABLET ORAL at 10:36

## 2018-01-10 RX ADMIN — ALBUTEROL SULFATE 1 MG: 108 AEROSOL, METERED RESPIRATORY (INHALATION) at 08:25

## 2018-01-10 RX ADMIN — METHYLPREDNISOLONE SODIUM SUCCINATE 1 MG: 125 INJECTION, POWDER, FOR SOLUTION INTRAMUSCULAR; INTRAVENOUS at 15:54

## 2018-01-11 RX ADMIN — ALBUTEROL SULFATE 1 MG: 108 AEROSOL, METERED RESPIRATORY (INHALATION) at 16:15

## 2018-01-11 RX ADMIN — ALBUTEROL SULFATE 1 MG: 108 AEROSOL, METERED RESPIRATORY (INHALATION) at 21:10

## 2018-01-11 RX ADMIN — METOPROLOL TARTRATE 1 MG: 25 TABLET ORAL at 08:29

## 2018-01-11 RX ADMIN — ALBUTEROL SULFATE 1 MG: 108 AEROSOL, METERED RESPIRATORY (INHALATION) at 11:32

## 2018-01-11 RX ADMIN — CLOPIDOGREL BISULFATE 1 MG: 75 TABLET ORAL at 08:30

## 2018-01-11 RX ADMIN — ASPIRIN 1 MG: 81 TABLET, COATED ORAL at 08:30

## 2018-01-11 RX ADMIN — METOPROLOL TARTRATE 1 MG: 25 TABLET ORAL at 22:08

## 2018-01-11 RX ADMIN — ALBUTEROL SULFATE 1 MG: 108 AEROSOL, METERED RESPIRATORY (INHALATION) at 07:56

## 2018-01-11 RX ADMIN — POTASSIUM CHLORIDE 1 MEQ: 1500 TABLET, EXTENDED RELEASE ORAL at 08:29

## 2018-01-11 RX ADMIN — LISINOPRIL 1 MG: 2.5 TABLET ORAL at 08:29

## 2018-01-12 RX ADMIN — LISINOPRIL 1 MG: 2.5 TABLET ORAL at 08:58

## 2018-01-12 RX ADMIN — METOPROLOL TARTRATE 1 MG: 25 TABLET ORAL at 08:59

## 2018-01-12 RX ADMIN — CLOPIDOGREL BISULFATE 1 MG: 75 TABLET ORAL at 08:59

## 2018-01-12 RX ADMIN — ALBUTEROL SULFATE 1 MG: 108 AEROSOL, METERED RESPIRATORY (INHALATION) at 11:27

## 2018-01-12 RX ADMIN — ALBUTEROL SULFATE 1 MG: 108 AEROSOL, METERED RESPIRATORY (INHALATION) at 15:36

## 2018-01-12 RX ADMIN — FUROSEMIDE 1 MG: 10 INJECTION, SOLUTION INTRAMUSCULAR; INTRAVENOUS at 08:59

## 2018-01-12 RX ADMIN — POTASSIUM CHLORIDE 1 MEQ: 1500 TABLET, EXTENDED RELEASE ORAL at 08:58

## 2018-01-12 RX ADMIN — ALBUTEROL SULFATE 1 MG: 108 AEROSOL, METERED RESPIRATORY (INHALATION) at 07:39

## 2018-01-12 RX ADMIN — ASPIRIN 1 MG: 81 TABLET, COATED ORAL at 08:59
